# Patient Record
Sex: FEMALE | Race: OTHER | Employment: FULL TIME | URBAN - METROPOLITAN AREA
[De-identification: names, ages, dates, MRNs, and addresses within clinical notes are randomized per-mention and may not be internally consistent; named-entity substitution may affect disease eponyms.]

---

## 2017-02-15 ENCOUNTER — OFFICE VISIT (OUTPATIENT)
Dept: FAMILY MEDICINE CLINIC | Age: 42
End: 2017-02-15

## 2017-02-15 VITALS
HEIGHT: 61 IN | RESPIRATION RATE: 18 BRPM | BODY MASS INDEX: 26.36 KG/M2 | SYSTOLIC BLOOD PRESSURE: 144 MMHG | DIASTOLIC BLOOD PRESSURE: 88 MMHG | TEMPERATURE: 97.2 F | WEIGHT: 139.6 LBS | OXYGEN SATURATION: 98 % | HEART RATE: 95 BPM

## 2017-02-15 DIAGNOSIS — R51.9 HEADACHE DISORDER: ICD-10-CM

## 2017-02-15 DIAGNOSIS — J30.89 ENVIRONMENTAL AND SEASONAL ALLERGIES: ICD-10-CM

## 2017-02-15 DIAGNOSIS — I10 ESSENTIAL HYPERTENSION: Primary | ICD-10-CM

## 2017-02-15 RX ORDER — AMLODIPINE BESYLATE 10 MG/1
10 TABLET ORAL DAILY
Qty: 30 TAB | Refills: 6 | Status: SHIPPED | OUTPATIENT
Start: 2017-02-15 | End: 2017-09-22 | Stop reason: SDUPTHER

## 2017-02-15 NOTE — MR AVS SNAPSHOT
Visit Information [place] Francisco nelson Moralesarmanicaroline Personal Médico Departamento Teléfono del Dep. Número de visita 2/15/2017  1:20 PM Christopher Jenkins MD [address] [place] [address] [phone] Follow-up Instructions Return in about 6 months (around 8/15/2017) for Follow-up, HTN. Upcoming Health Maintenance Date Due DTaP/Tdap/Td series (1 - Tdap) 3/17/1996 PAP AKA CERVICAL CYTOLOGY 3/17/1996 INFLUENZA AGE 9 TO ADULT 8/1/2016 Alergias  Review Complete El: 2/15/2017 Por: Christopher Jenkins MD  
 A partir del:  2/15/2017 Intensidad Anotado Tipo de reacción [employer]  05/22/2014    Rash, Swelling Vacunas actuales Ahmed Kans No hay ninguna vacuna archivada. No revisadas esta visita You Were Diagnosed With   
  
 Suni Davis Essential hypertension    -  Primary ICD-10-CM: I10 
ICD-9-CM: 401.9 Environmental and seasonal allergies     ICD-10-CM: J30.89 ICD-9-CM: 477.8 Headache disorder     ICD-10-CM: R46 ICD-9-CM: 784.0 Partes vitales PS Pulso Temperatura Resp [place] ( percentil de crecimiento) Peso (percentil de crecimiento) 144/88 (BP 1 Location: Left arm, BP Patient Position: Sitting) 95 97.2 °F (36.2 °C) (Oral) 18 5' 1.42\" (1.56 m) 139 lb 9.6 oz (63.3 kg) SpO2 BMI (IMC) Estado obstétrico Estatus de tabaquísmo 98% 26.02 kg/m2 Menopause Never Smoker Historial de signos vitales BMI and BSA Data Body Mass Index Body Surface Area 26.02 kg/m 2 1.66 m 2 [facility] Name Phone [facility], [address] [place] [address] [phone] Richards lista de medicamentos actualizada Lista actualizada el: 2/15/17  1:56 PM.  Kimo Andres use richards lista de medicamentos más reciente. amLODIPine 10 mg tablet También conocido lydia:  Rand Colon Take 1 Tab by mouth daily. cetirizine 10 mg tablet También conocido lydia:  ZYRTEC Take 1 Tab by mouth daily. Recetas Enviado a la [place] Refills  
 amLODIPine (NORVASC) 10 mg tablet 6 Sig: Take 1 Tab by mouth daily. Class: Normal  
 Pharmacy: [facility] Man[facility]et Titus, [address] #: [phone] Route: Oral  
  
Instrucciones de seguimiento Return in about 6 months (around 8/15/2017) for Follow-up, HTN. Instrucciones para el Paciente Dieta DASH: Instrucciones de cuidado - [ DASH Diet: Care Instructions ] Instrucciones de cuidado La dieta DASH es un plan de alimentación que puede ayudar a bajar la presión arterial. DASH es la sigla en inglés de \"Dietary Approaches to Stop Hypertension\" (medidas dietéticas para disminuir la hipertensión). Hipertensión significa presión arterial simon. La dieta DASH se concentra en el consumo de alimentos con alto contenido de calcio, potasio y [place]. Estos nutrientes pueden disminuir la presión arterial. Los alimentos con el mayor contenido de [place] nutrientes son las frutas, las verduras, los productos lácteos de bajo contenido de [place], las nueces, las semillas y las legumbres. Lorena chela suplementos de calcio, potasio y magnesio, en lugar de comer alimentos ricos en estos nutrientes, no tiene el mismo efecto. La dieta DASH también incluye granos integrales, pescado y aves. La dieta DASH es maria dolores de los cambios de estilo de ko que quizá le recomiende baig médico para reducir la presión arterial simon. Es posible que baig médico también quiera que usted reduzca la cantidad de sodio en baig [place] Brendan. Reducir el sodio mientras sigue la dieta DASH puede bajar la presión arterial incluso más que únicamente con la dieta DASH. La atención de seguimiento es shaila parte clave de baig tratamiento y seguridad.  Asegúrese de hacer y acudir a todas las citas, y llame a baig médico si está teniendo problemas. También es shaila buena idea saber los resultados de cuauhtemoc exámenes y mantener shaila lista de los medicamentos que brian. Cómo puede cuidarse en el hogar? Cómo seguir la dieta DASH 
· Coma entre 4 y 5 porciones de fruta al día. Shaila porción incluye 1 fruta de [place], ½ taza de fruta enlatada o cortada en trozos, 1/4 taza de fruta seca o 4 onzas (½ taza) de jugo de frutas. Elija fruta con más frecuencia que jugo. · Consuma entre 4 y 11 porciones de verduras al día. Shaila porción incluye 1 taza de [place]bra o de verduras de hojas crudas, ½ taza de verduras cocidas o cortadas en trozos, o 4 onzas (½ taza) de jugo de verduras. Elija comer verduras con más frecuencia que chela baig jugo. · Consuma entre 2 y 3 porciones de lácteos semidescremados y descremados al día. Shaila porción incluye 8 onzas de [place], 1 taza de yogur o 1½ onzas de [place]-barre. · Coma entre 6 y 6 porciones de granos todos los [address] Shaila porción incluye 1 rebanada de pan, 1 onza de cereal seco, o ½ taza de arroz, pasta o cereal cocido. Trate de elegir productos de grano integral con la mayor frecuencia posible. · Limite la cantidad de [place] aves y pescado a 2 porciones al día. Roseann Martini porción son 3 onzas, lo que es aproximadamente el tamaño de un shy de naipes. · Coma entre 4 y 5 porciones de nueces, semillas y legumbres (frijoles [habichuelas], lentejas y arvejas [chícharos] secos y cocidos) a la semana. Shaila porción incluye 1/3 taza de nueces, 2 cucharadas de semillas o ½ taza de frijoles o arvejas cocidos. · [address] y aceites a 2 o 3 porciones al día. Shaila porción es 1 cucharadita de aceite vegetal o 2 cucharadas de aderezo para ensaladas. · Limite los dulces y el azúcar adicional a 5 porciones o menos por semana. Roseann Martini porción es 1 cucharada de [place] o [place], ½ taza de sorbete o 1 taza de limonada. · Consuma menos de 2,300 miligramos (mg) de sodio al día.  Si limita baig consumo de sodio a 1,500 mg al día, puede reducir baig presión arterial aún más. Consejos para tener éxito · Inicie con cambios pequeños. No intente hacer cambios radicales en baig dieta de manera repentina. Podría sentir que extraña cuauhtemoc comidas favoritas y, por lo [place], jasvir shaila mayor probabilidad de que no cumpla el plan. Michelle Negron y [place]. Clara Holes que esos cambios se conviertan en un hábito, incorpore algunos [employer]. · Pruebe algo de lo siguiente: 
¨ Fíjese el objetivo de comer shaila porción de fruta o de verduras en todas las comidas y los refrigerios. [place] facilitará alcanzar la cantidad diaria recomendada de frutas y verduras. ¨ Pruebe comer yogur cubierto con frutas frescas y nueces lydia refrigerio o lydia postre saludable. ¨ Agregue jorge a, tomate, pepino y cebolla a cuauhtemoc sándwiches. ¨ Combine shaila masa de pizza precocida con queso mozzarella de bajo contenido de grasa (\"low-fat\") y cúbralo con abundantes verduras. Intente utilizar tomates, calabaza, espinaca, brócoli, zanahorias, coliflor y cebollas. ¨ Opte por comer shaila variedad de verduras cortadas en trozos con un aderezo de bajo contenido de grasa lydia refrigerio, en lugar de \"chips\" (tipo janet fritas) y un \"dip\" (producto untable). ¨ Espolvoree semillas de girasol o almendras picadas [employer]. O intente agregar nueces o almendras picadas a las verduras cocidas. ¨ Pruebe algunas comidas vegetarianas con frijoles y arvejas. Arnoldo Osmany o frijoles rojos a las ensaladas. Prepare burritos y tacos con puré de frijoles pintos o negros. Dónde puede encontrar más información en inglés? Erika Klein a [email or web address] Regan COSBY86 en la búsqueda para aprender más acerca de \"Dieta DASH: Instrucciones de cuidado - [ DASH Diet: Care Instructions ]. \" 
Revisado: 23 marzo, 2016 Versión del contenido: 11.1 © 0150-0902 [employer] Inc[employer].  Las instrucciones de cuidado fueron adaptadas bajo licencia por [employer] Connections (which disclaims liability or warranty for this information). Si usted tiene [place] afección médica o sobre estas instrucciones, siempre pregunte a baig profesional de elizabeth. [facility], Incorporated niega toda garantía o responsabilidad por baig uso de esta información. Introducing [facility]! [facility] introduce portal paciente MyChart . Ahora se puede acceder a partes de baig expediente médico, enviar por correo electrónico la oficina de baig médico y solicitar renovaciones de medicamentos en línea. En baig navegador de Internet , Lazarus Newberry a [email or web address] Thelma clic en el usuario por Gabe Ingamy? Javad Greens clic aquí en la sesión Norvel Monica. Verá la página de registro [place]. Ingrese baig código de [employer] syd y lydia aparece a continuación. [place] no tendrá que [employer] código después de jasvir completado el proceso de registro . Si usted no se inscribe antes de la fecha de caducidad , debe solicitar un nuevo código. · MyChart Código de acceso : [id number] Expires: 5/16/2017  1:23 PM 
 
Ingresa los últimos cuatro dígitos de baig Número de Seguro Social ( xxxx ) y fecha de nacimiento ( dd / mm / aaaa ) lydia se indica y thelma clic en Enviar. [place] será llevado a la siguiente página de registro . Crear un ID MyChart . Esta será baig ID de inicio de sesión de MyChart y no puede ser [place] , por lo que pensar en shaila que es [place]  y fácil de recordar . Crear shaila contraseña MyChart . lCyde puede cambiar baig contraseña en cualquier momento . Ingrese baig Password Reset de preguntas y [place] . [place] se puede utilizar en un momento posterior si usted olvida baig contraseña. Introduzca baig dirección de correo electrónico . Janett Lockett recibirá shaila notificación por correo electrónico cuando la nueva información está disponible en MyChart . Greg shook en Registrarse.  Daralene Mecca melvin y descargar porciones de baig expediente Shonna shook en el enlace de descarga del menú Resumen para descargar shaila copia portátil de baig información médica . Si tiene [employer] , por favor visite la sección de preguntas frecuentes del sitio web MyChart . Recuerde, MyChart NO es que se utilizará para las necesidades urgentes. Para emergencias médicas , llame al 911 . Ahora disponible en baig iPhone y Android ! Por favor proporcione lesley resumen de la documentación de cuidado a baig próximo proveedor. Your primary care clinician is listed as Phys Other. If you have any questions after today's visit, please call [phone].

## 2017-02-15 NOTE — PATIENT INSTRUCTIONS
Dieta DASH: Instrucciones de cuidado - [ DASH Diet: Care Instructions ]  Instrucciones de cuidado  La dieta DASH es un plan de alimentación que puede ayudar a bajar la presión arterial. DASH es la sigla en inglés de \"Dietary Approaches to Stop Hypertension\" (medidas dietéticas para disminuir la hipertensión). Hipertensión significa presión arterial simon. La dieta DASH se concentra en el consumo de alimentos con alto contenido de calcio, potasio y [place]. Estos nutrientes pueden disminuir la presión arterial. Los alimentos con el mayor contenido de [place] nutrientes son las frutas, las verduras, los productos lácteos de bajo contenido de [place], las nueces, las semillas y las legumbres. Lorena chela suplementos de calcio, potasio y magnesio, en lugar de comer alimentos ricos en estos nutrientes, no tiene el mismo efecto. La dieta DASH también incluye granos integrales, pescado y aves. La dieta DASH es maria dolores de los cambios de estilo de ko que quizá le recomiende baig médico para reducir la presión arterial simon. Es posible que baig médico también quiera que usted reduzca la cantidad de sodio en baig [place]ista Sit. Reducir el sodio mientras sigue la dieta DASH puede bajar la presión arterial incluso más que únicamente con la dieta DASH. La atención de seguimiento es shaila parte clave de baig tratamiento y seguridad. Asegúrese de hacer y acudir a todas las citas, y llame a baig médico si está teniendo problemas. También es shaila buena idea saber los resultados de cuauhtemoc exámenes y mantener shaila lista de los medicamentos que brian. ¿Cómo puede cuidarse en el hogar? Cómo seguir la dieta DASH  · Coma entre 4 y 5 porciones de fruta al día. Shaila porción incluye 1 fruta de [place], ½ taza de fruta enlatada o cortada en trozos, 1/4 taza de fruta seca o 4 onzas (½ taza) de jugo de frutas. Elija fruta con más frecuencia que jugo. · Consuma entre 4 y 11 porciones de verduras al día.  Shaila porción incluye 1 taza de [place]bra o de verduras de hojas crudas, ½ taza de verduras cocidas o cortadas en trozos, o 4 onzas (½ taza) de jugo de verduras. Elija comer verduras con más frecuencia que chela baig jugo. · Consuma entre 2 y 3 porciones de lácteos semidescremados y descremados al día. Shaila porción incluye 8 onzas de [place], 1 taza de yogur o 1½ onzas de [place]-barre. · Coma entre 6 y 6 porciones de granos todos los [address] Shaila porción incluye 1 rebanada de pan, 1 onza de cereal seco, o ½ taza de arroz, pasta o cereal cocido. Trate de elegir productos de grano integral con la mayor frecuencia posible. · Limite la cantidad de [facility] (the territory [place]), aves y pescado a 2 porciones al día. Kim Billow porción son 3 onzas, lo que es aproximadamente el tamaño de un shy de naipes. · Coma entre 4 y 5 porciones de nueces, semillas y legumbres (frijoles [habichuelas], lentejas y arvejas [chícharos] secos y cocidos) a la semana. Shaila porción incluye 1/3 taza de nueces, 2 cucharadas de semillas o ½ taza de frijoles o arvejas cocidos. · [address] [facility] y aceites a 2 o 3 porciones al día. Shaila porción es 1 cucharadita de aceite vegetal o 2 cucharadas de aderezo para ensaladas. · Limite los dulces y el azúcar adicional a 5 porciones o menos por semana. Kim Billow porción es 1 cucharada de Ronal o [place], ½ taza de sorbete o 1 taza de limonada. · Consuma menos de 2,300 miligramos (mg) de sodio al día. Si limita baig consumo de sodio a 1,500 mg al día, puede reducir baig presión arterial aún más. Consejos para tener éxito  · Inicie con cambios pequeños. No intente hacer cambios radicales en baig dieta de manera repentina. Podría sentir que extraña cuauhtemoc comidas favoritas y, por lo [place], jasvir shaila mayor probabilidad de que no cumpla el plan. Melissa Amaya. Karn Bernheim que esos cambios se conviertan en un hábito, incorpore algunos [employer]. · Pruebe algo de lo siguiente:  ¨ Fíjese el objetivo de comer shaila porción de fruta o de verduras en todas las comidas y los refrigerios.  Cedric Brightly alcanzar la cantidad diaria recomendada de frutas y verduras. ¨ Pruebe comer yogur cubierto con frutas frescas y nueces lydia refrigerio o lydia postre saludable. ¨ Agregue jorge a, tomate, pepino y cebolla a cuauhtemoc sándwiches. ¨ Combine shaila masa de pizza precocida con queso mozzarella de bajo contenido de grasa (\"low-fat\") y cúbralo con abundantes verduras. Intente utilizar tomates, calabaza, espinaca, brócoli, zanahorias, coliflor y cebollas. ¨ Opte por comer shaila variedad de verduras cortadas en trozos con un aderezo de bajo contenido de grasa lydia refrigerio, en lugar de \"chips\" (tipo janet fritas) y un \"dip\" (producto untable). ¨ Espolvoree semillas de girasol o almendras picadas [employer]. O intente agregar nueces o almendras picadas a las verduras cocidas. ¨ Pruebe algunas comidas vegetarianas con frijoles y arvejas. Ashley Callas o frijoles rojos a las ensaladas. Prepare burritos y tacos con puré de frijoles pintos o negros. ¿Dónde puede encontrar más información en inglés? Carrillo Valladares a http://[email or web address]-reina.info/. Armand COOK en la búsqueda para aprender más acerca de \"Dieta DASH: Instrucciones de cuidado - [ DASH Diet: Care Instructions ]. \"  Revisado: 23 marzo, 2016  Versión del contenido: 11.1  © 1431-4690 "[employer] Incorporated. Las instrucciones de cuidado fueron adaptadas bajo licencia por Good Help Connections (which disclaims liability or warranty for this information). Si usted tiene [place] afección médica o sobre estas instrucciones, siempre pregunte a baig profesional de elizabeth. [facility], Incorporated niega toda garantía o responsabilidad por baig uso de esta información.

## 2017-02-15 NOTE — PROGRESS NOTES
HISTORY OF PRESENT ILLNESS  Clarissa Councilman is a 39 y.o. female. Hypertension   The history is provided by the patient. This is a chronic problem. Progression since onset: Bp has been under control . she has been on Amlodipine . No notable side effects. Compliant with medications. Headache    The history is provided by the patient. This is a chronic problem. Progression since onset: She has been havin headache since last 2 weeks . She has tried Advil which has helped . Allergies   The history is provided by the patient. This is a chronic problem. Progression since onset: She has no symptoms at present and she has been taking Cetirizine . Review of Systems   Constitutional: Negative. HENT: Negative. Eyes: Negative. Respiratory: Negative. Cardiovascular: Negative. Gastrointestinal: Negative. Genitourinary: Negative. Musculoskeletal: Negative. Skin: Negative. Neurological: Negative. Endo/Heme/Allergies: Negative. Psychiatric/Behavioral: Negative. Physical Exam  General:   Head: Alert, cooperative, no distress, appears stated age. Normocephalic and atraumatic   Eyes:  Conjunctivae/corneas clear. PERRL, EOMs intact. Ears:  Normal TMs and external ear canals both ears. Nose: Nares normal. Septum midline. Mucosa normal. No drainage or sinus tenderness. Mouth:  Throat: Lips, mucosa, and tongue normal. Teeth and gums normal.  No lesions or exudates. Neck: Supple, symmetrical, trachea midline, no adenopathy, thyroid: no enlargement/tenderness/nodules. Back:   Symmetric, no curvature. ROM normal. No CVA tenderness. Lungs:   Clear to auscultation bilaterally. Heart:  Regular rate and rhythm, S1, S2 normal, no murmur, click, rub or gallop. Abdomen:   Soft, non-tender. Bowel sounds normal. No masses,  No organomegaly. Extremities: Extremities normal, atraumatic, no cyanosis or edema. Pulses: 2+ and symmetric all extremities.    Skin: Skin color, texture, turgor normal. No rashes or lesions   Lymph nodes: Cervical & supraclavicular nodes normal.   Neurologic: CNII-XII intact. Normal strength, sensation and reflexes throughout. Psych:                      Normal mood and affect     ASSESSMENT and PLAN  Encounter Diagnoses   Name Primary?  Essential hypertension Yes    Environmental and seasonal allergies     Headache disorder      Orders Placed This Encounter    amLODIPine (NORVASC) 10 mg tablet   Follow-up Disposition:  Return in about 6 months (around 8/15/2017) for Follow-up, HTN. Reviewed and discussed previous lab results. She was advised to continue with current medications. She was given an after visit summary which includes diagnoses, vital signs, current medications, &  authorized prescriptions. Patient verbalized understanding.

## 2017-09-22 ENCOUNTER — OFFICE VISIT (OUTPATIENT)
Dept: FAMILY MEDICINE CLINIC | Age: 42
End: 2017-09-22

## 2017-09-22 VITALS
BODY MASS INDEX: 26.58 KG/M2 | HEIGHT: 61 IN | SYSTOLIC BLOOD PRESSURE: 142 MMHG | HEART RATE: 96 BPM | RESPIRATION RATE: 12 BRPM | WEIGHT: 140.8 LBS | DIASTOLIC BLOOD PRESSURE: 101 MMHG | OXYGEN SATURATION: 100 % | TEMPERATURE: 98.5 F

## 2017-09-22 DIAGNOSIS — I10 ESSENTIAL HYPERTENSION: Primary | ICD-10-CM

## 2017-09-22 DIAGNOSIS — M79.672 PAIN OF LEFT HEEL: ICD-10-CM

## 2017-09-22 DIAGNOSIS — J30.89 NON-SEASONAL ALLERGIC RHINITIS, UNSPECIFIED ALLERGIC RHINITIS TRIGGER: ICD-10-CM

## 2017-09-22 RX ORDER — AMLODIPINE BESYLATE 10 MG/1
10 TABLET ORAL DAILY
Qty: 90 TAB | Refills: 3 | Status: SHIPPED | OUTPATIENT
Start: 2017-09-22 | End: 2018-08-14 | Stop reason: SDUPTHER

## 2017-09-22 NOTE — PROGRESS NOTES
Kike Melendez [address] [place], [address] [place] [address] Road  [phone]             Date of visit: 2017   Subjective:      History obtained from:  the patient. Adrianne Talavera is a 43 y.o. 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 Walks a lot on her job at [employer] some other exercise as well  Tries to eat well, plenty of veggies  Drinks water, not soda      [facility] pap smear. Was told it was normal, about 1.5 years ago  Has implant in arm for birth control    Uses zyrtec every few days prn allergies, works well for her    Patient Active Problem List    Diagnosis Date Noted    Pain of left heel 2017    Essential hypertension 2017    Non-seasonal allergic rhinitis 2017     Current Outpatient Prescriptions on File Prior to Visit   Medication Sig Dispense Refill    cetirizine (ZYRTEC) 10 mg tablet Take 1 Tab by mouth daily. (Patient taking differently: Take 10 mg by mouth daily as needed.) 30 Tab 3     No current facility-administered medications on file prior to visit.       Allergies   Allergen Reactions    Seafood Rash and Swelling     not all seafood- can eat shrimp and salmon     Past Medical History:   Diagnosis Date    Hypertension      Past Surgical History:   Procedure Laterality Date    HX  SECTION      X3    HX HERNIA REPAIR      umbilical hernia repair      Family History   Problem Relation Age of Onset    Hypertension Mother     Hypertension Sister    [facility] Anesth Problems Neg Hx      Social History   Substance Use Topics    Smoking status: Never Smoker    Smokeless tobacco: Never Used    Alcohol use No      Social History     Social History Narrative          Review of Systems  Card: denies chest pain  Pulm: denies shortness of breath        Objective:     Vitals:    09/22/17 1006 09/22/17 1014   BP: (!) 158/100 (!) 142/101   Pulse: 96    Resp: 12    Temp: 98.5 °F (36.9 °C)    TempSrc: Oral    SpO2: 100%    Weight: 140 lb 12.8 oz (63.9 kg)    Height: 5' 1\" (1.549 m)      Body mass index is 26.6 kg/(m^2). General: stated age, well developed, well nourished and in NAD  Neck: supple, symmetrical, trachea midline, no adenopathy and thyroid: not enlarged, symmetric, no tenderness/mass/nodules  Lungs:  clear to auscultation w/o rales, rhonchi, wheezes w/normal effort and no use of accessory muscles of respiration   Heart: regular rate and rhythm, S1, S2 normal, no murmur, click, rub or gallop  Abdomen: soft, nontender  Ext:  No edema noted. MSK: left heel tender at insertion plantar fasciia, no swelling or deformity  Lymph: no cervical adenopathy appreciated  Skin:  Normal. and no rash or abnormalities   Psych: alert and oriented to person, place, time and situation and Speech: appropriate quality, quantity and organization of sentences    Lab Results   Component Value Date/Time    Sodium 141 09/09/2015 04:43 PM    Potassium 4.1 09/09/2015 04:43 PM    Chloride 102 09/09/2015 04:43 PM    CO2 22 09/09/2015 04:43 PM    Glucose 119 09/09/2015 04:43 PM    BUN 9 09/09/2015 04:43 PM    Creatinine 1.02 09/09/2015 04:43 PM    BUN/Creatinine ratio 9 09/09/2015 04:43 PM    GFR est AA 80 09/09/2015 04:43 PM    GFR est non-AA 69 09/09/2015 04:43 PM    Calcium 9.3 09/09/2015 04:43 PM    Bilirubin, total <0.2 09/09/2015 04:43 PM    AST (SGOT) 13 09/09/2015 04:43 PM    Alk.  phosphatase 76 09/09/2015 04:43 PM    Protein, total 7.5 09/09/2015 04:43 PM    Albumin 4.3 09/09/2015 04:43 PM    A-G Ratio 1.3 09/09/2015 04:43 PM    ALT (SGPT) 14 09/09/2015 04:43 PM     Lab Results   Component Value Date/Time    WBC 6.9 09/09/2015 04:43 PM    HGB 14.0 09/09/2015 04:43 PM    HCT 41.1 09/09/2015 04:43 PM    PLATELET 472 25/97/8216 04:43 PM    MCV 84 09/09/2015 04:43 PM     Lab Results   Component Value Date/Time    TSH 0.955 05/22/2014 11:37 AM       Assessment/Plan:       ICD-10-CM ICD-9-CM    1. Essential hypertension P67 082.7 METABOLIC PANEL, COMPREHENSIVE      LIPID PANEL   2. Pain of left heel M79.672 729.5    3. Non-seasonal allergic rhinitis, unspecified allergic rhinitis trigger J30.89 477.8        Orders Placed This Encounter    METABOLIC PANEL, COMPREHENSIVE    LIPID PANEL    amLODIPine (NORVASC) 10 mg tablet     Was doing well on norvasc, just needs to get back on it  goodrx coupon given  Encouraged her to check it weekly and update me if running high  Continued healthy diet/exercise encouraged    Left heel pain not sure of diagnosis but may be plantar fasciitis  Advised starting with blue gel inserts, ice bid for 15 min when painful  Tylenol prn  Avoiding nsaids given htn    Allergies do well with prn zyrtec    Discussed the diagnosis and plan and she expressed understanding. Follow-up Disposition:  Return in about 1 year (around 9/22/2018).     Bib Pittman MD

## 2017-09-22 NOTE — MR AVS SNAPSHOT
Visit Information Becca Krishna De [place] Personal M[facility]ico Departamento Teléfono del Dep. Número de visita 9/22/2017 10:00 AM Albert [place], [address] [phone] Upcoming Health Maintenance Date Due  
 PAP AKA CERVICAL CYTOLOGY 3/17/1996 INFLUENZA AGE 9 TO ADULT 8/1/2017 DTaP/Tdap/Td series (2 - Td) 1/7/2023 Alergias  Review Complete El: 9/22/2017 Por: MD Davon Hargrove del:  9/22/2017 Intensidad Anotado Tipo de reacción [employer]  05/22/2014    Rash, Swelling  
 not all seafood- can eat shrimp and salmon Vacunas actuales Revisadas el:  9/14/2017 Clemetine Ours Hep B Vaccine 1/19/2010, 7/13/2009, 5/12/2009 Influenza Vaccine 12/17/2014 MMR 5/20/2008, 8/4/2006 Td 1/7/2013, 5/3/2011 Tdap 6/27/2012 No revisadas esta visita You Were Diagnosed With   
  
 Fozia Geri Essential hypertension    -  Primary ICD-10-CM: I10 
ICD-9-CM: 401.9 Partes vitales PS Pulso Temperatura Resp [place] ( percentil de crecimiento) Peso (percentil de crecimiento) (!) 142/101 (BP 1 Location: Left arm, BP Patient Position: Sitting) 96 98.5 °F (36.9 °C) (Oral) 12 5' 1\" (1.549 m) 140 lb 12.8 oz (63.9 kg) SpO2 BMI (IMC) Estado obstétrico Estatus de tabaquísmo 100% 26.6 kg/m2 Menopause Never Smoker Historial de signos vitales BMI and BSA Data Body Mass Index Body Surface Area  
 26.6 kg/m 2 1.66 m 2 [facility] Name Phone Roberto Nino [address], [address] [phone] Richards lista de medicamentos actualizada Lista actualizada el: 9/22/17 10:42 AM.  David Scott use richards lista de medicamentos más reciente. amLODIPine 10 mg tablet También conocido lydia:  Marco Antonio Ballard Take 1 Tab by mouth daily. cetirizine 10 mg tablet También conocido lydia:  ZYRTEC  
 Take 1 Tab by mouth daily. Recetas Enviado a la [place] Refills  
 amLODIPine (NORVASC) 10 mg tablet 3 Sig: Take 1 Tab by mouth daily. Class: Normal  
 Pharmacy: Flori [facility], [address]  #: [phone] Route: Oral  
  
Hicimos lo siguiente LIPID PANEL [20293 CPT(R)] METABOLIC PANEL, COMPREHENSIVE [87486 CPT(R)] Introducing [facility]! [facility] introduce portal paciente MyChart . Ahora se puede acceder a partes de baig expediente médico, enviar por correo electrónico la oficina de baig médico y solicitar renovaciones de medicamentos en línea. En baig navegador de Internet , Catherine Plume a [email or web address] Thelma clic en el usuario por Sofia Crowley? Nonda Slates clic aquí en la sesión Swathi Aver. Verá la página de registro Murphy. Ingrese baig código de [employer] syd y lydia aparece a continuación. Usted no tendrá que [employer] código después de jasvir completado el proceso de registro . Si usted no se inscribe antes de la fecha de caducidad , debe solicitar un nuevo código. · MyChart Código de acceso : [facility] Expires: 12/21/2017 10:42 AM 
 
Ingresa los últimos cuatro dígitos de baig Número de Seguro Social ( xxxx ) y fecha de nacimiento ( dd / mm / aaaa ) lydia se indica y thelma clic en Enviar. Usted será llevado a la siguiente página de registro . Crear un ID MyChart . Esta será baig ID de inicio de sesión de MyChart y no puede ser [place] , por lo que pensar en shaila que es [place] y fácil de recordar . Crear shaila contraseña MyChart . Usted puede cambiar baig contraseña en cualquier momento . Ingrese baig Password Reset de preguntas y [place] . [place] se puede utilizar en un momento posterior si usted olvida baig contraseña. Introduzca baig dirección de correo electrónico . Debbie [place] recibirá shaila notificación por correo electrónico cuando la nueva información está disponible en MyChart . Soni Late clic en Registrarse. [place] [employer] melvin y descargar porciones de baig expediente médico. 
Annelise clic en el enlace de descarga del menú Resumen para descargar shaila copia portátil de baig información médica . Si tiene [employer] , por favor visite la sección de preguntas frecuentes del sitio web MyChart . Recuerde, MyChart NO es que se utilizará para las necesidades urgentes. Para emergencias médicas , llame al 911 . Ahora disponible en baig iPhone y Android ! Por favor proporcione lesley resumen de la documentación de cuidado a baig próximo proveedor. Your primary care clinician is listed as Dmitri Gonzalez. If you have any questions after today's visit, please call [phone].

## 2017-09-22 NOTE — PROGRESS NOTES
1. Have you been to the ER, urgent care clinic since your last visit? Hospitalized since your last visit? No    2. Have you seen or consulted any other health care providers outside of the [address] [place] [address] since your last visit? Include any pap smears or colon screening.  No       Chief Complaint   Patient presents with    New Patient     fasting

## 2017-09-23 PROBLEM — E78.5 HYPERLIPIDEMIA, MILD: Status: ACTIVE | Noted: 2017-09-23

## 2017-09-23 LAB
ALBUMIN SERPL-MCNC: 4.3 G/DL (ref 3.5–5.5)
ALBUMIN/GLOB SERPL: 1.2 {RATIO} (ref 1.2–2.2)
ALP SERPL-CCNC: 87 IU/L (ref 39–117)
ALT SERPL-CCNC: 22 IU/L (ref 0–32)
AST SERPL-CCNC: 19 IU/L (ref 0–40)
BILIRUB SERPL-MCNC: 0.2 MG/DL (ref 0–1.2)
BUN SERPL-MCNC: 13 MG/DL (ref 6–24)
BUN/CREAT SERPL: 18 (ref 9–23)
CALCIUM SERPL-MCNC: 8.9 MG/DL (ref 8.7–10.2)
CHLORIDE SERPL-SCNC: 99 MMOL/L (ref 96–106)
CHOLEST SERPL-MCNC: 210 MG/DL (ref 100–199)
CO2 SERPL-SCNC: 22 MMOL/L (ref 18–29)
CREAT SERPL-MCNC: 0.74 MG/DL (ref 0.57–1)
GLOBULIN SER CALC-MCNC: 3.7 G/DL (ref 1.5–4.5)
GLUCOSE SERPL-MCNC: 97 MG/DL (ref 65–99)
HDLC SERPL-MCNC: 45 MG/DL
INTERPRETATION, 910389: NORMAL
LDLC SERPL CALC-MCNC: 132 MG/DL (ref 0–99)
POTASSIUM SERPL-SCNC: 4.2 MMOL/L (ref 3.5–5.2)
PROT SERPL-MCNC: 8 G/DL (ref 6–8.5)
SODIUM SERPL-SCNC: 135 MMOL/L (ref 134–144)
TRIGL SERPL-MCNC: 165 MG/DL (ref 0–149)
VLDLC SERPL CALC-MCNC: 33 MG/DL (ref 5–40)

## 2017-09-23 NOTE — PROGRESS NOTES
Sent in letter:  Kidney, liver, sugar, and electrolytes are fine. Cholesterol is a little high but not bad enough to need medication. I recommend you eat plenty of plant foods and less fatty animal foods (such as red meat, hard cheese, or butter). Getting plenty of exercise is also important.

## 2018-08-14 ENCOUNTER — OFFICE VISIT (OUTPATIENT)
Dept: FAMILY MEDICINE CLINIC | Age: 43
End: 2018-08-14

## 2018-08-14 VITALS
DIASTOLIC BLOOD PRESSURE: 88 MMHG | RESPIRATION RATE: 18 BRPM | TEMPERATURE: 98.7 F | HEART RATE: 91 BPM | OXYGEN SATURATION: 97 % | HEIGHT: 61 IN | WEIGHT: 134 LBS | SYSTOLIC BLOOD PRESSURE: 138 MMHG | BODY MASS INDEX: 25.3 KG/M2

## 2018-08-14 DIAGNOSIS — I10 ESSENTIAL HYPERTENSION: Primary | ICD-10-CM

## 2018-08-14 DIAGNOSIS — M79.672 PAIN OF LEFT HEEL: ICD-10-CM

## 2018-08-14 DIAGNOSIS — M72.2 PLANTAR FASCIITIS, LEFT: ICD-10-CM

## 2018-08-14 RX ORDER — AMLODIPINE BESYLATE 10 MG/1
10 TABLET ORAL DAILY
Qty: 90 TAB | Refills: 3 | Status: SHIPPED | OUTPATIENT
Start: 2018-08-14

## 2018-08-14 NOTE — MR AVS SNAPSHOT
[address] 
[phone] Patient: Raimundo Johnston MRN: [id number] Visit Information Leslie Hamilton Personal Médico Departamento Teléfono del Dep. Número de visita 8/14/2018  3:30 PM Madeline Cosme, [address] [facility] [address] [phone] Follow-up Instructions Return in about 1 year (around 8/14/2019). Upcoming Health Maintenance Date Due Influenza Age 5 to Adult 10/1/2018* PAP AKA CERVICAL CYTOLOGY 3/22/2019 DTaP/Tdap/Td series (2 - Td) 1/7/2023 *Topic was postponed. The date shown is not the original due date. Alergias  Review Complete El: 8/14/2018 Por: Scooter León LPN A partir del:  8/14/2018 Intensidad Anotado Tipo de reacción [employer]  05/22/2014    Rash, Swelling  
 not all seafood- can eat shrimp and salmon Vacunas actuales Revisadas el:  9/14/2017 Rey Lang Hep B Vaccine 1/19/2010, 7/13/2009, 5/12/2009 Influenza Vaccine 12/17/2014 MMR 5/20/2008, 8/4/2006 Td 1/7/2013, 5/3/2011 Tdap 6/27/2012 No revisadas esta visita You Were Diagnosed With   
  
 Dania Prim Essential hypertension    -  Primary ICD-10-CM: I10 
ICD-9-CM: 401.9 Pain of left heel     ICD-10-CM: B40.203 ICD-9-CM: 729.5 Plantar fasciitis, left     ICD-10-CM: M72.2 ICD-9-CM: 728.71 Partes vitales PS Pulso Temperatura Resp [place] ( percentil de crecimiento) Peso (percentil de crecimiento) 138/88 (BP 1 Location: Right arm, BP Patient Position: Sitting) 91 98.7 °F (37.1 °C) (Oral) 18 5' 1\" (1.549 m) 134 lb (60.8 kg) SpO2 BMI (IMC) Estado obstétrico Estatus de tabaquísmo 97% 25.32 kg/m2 Menopause Never Smoker Historial de signos vitales BMI and BSA Data Body Mass Index Body Surface Area  
 25.32 kg/m 2 1.62 m 2 [facility] Name Phone Laci Lawson [address], [facility] [facility] [address] [phone] Baig lista de medicamentos actualizada Michel Barboza actualizada 8/14/18  4:26 PM.  Herminio Megan use baig lista de medicamentos más reciente. amLODIPine 10 mg tablet También conocido lydia:  Lennis Eagles Take 1 Tab by mouth daily. cetirizine 10 mg tablet También conocido lydia:  ZYRTEC Take 1 Tab by mouth daily. Recetas Enviado a la Gama Refills  
 amLODIPine (NORVASC) 10 mg tablet 3 Sig: Take 1 Tab by mouth daily. Class: Normal  
 Pharmacy: Laci Lawson [facility], [address] [facility] #: [phone] Route: Oral  
  
Instrucciones de seguimiento Return in about 1 year (around 8/14/2019). Instrucciones para el Paciente You have plantar fasciitis Ice it 1-2x per day for 15 min with frozen water bottles Wear very comfy shoes Stretch foot back 2x per day for 30 seconds If not starting to get better in a couple of months we could get you braces to wear at night or consider injection, so just call It might take 6-12 months to completely go away Doing strength training can also help a great deal! Watch how at: 
https://Kindling/ Dieta DASH: Instrucciones de cuidado - [ DASH Diet: Care Instructions ] Instrucciones de cuidado La dieta DASH es un plan de alimentación que puede ayudar a bajar la presión arterial. DASH es la sigla en inglés de \"Dietary Approaches to Stop Hypertension\" (medidas dietéticas para disminuir la hipertensión). Hipertensión significa presión arterial simon. La dieta DASH se concentra en el consumo de alimentos con alto contenido de calcio, potasio y [place].  Estos nutrientes pueden disminuir la presión arterial. Los alimentos con el mayor contenido de [employer] nutrientes son las frutas, las verduras, los productos lácteos de bajo contenido de grasa, las nueces, las semillas y las legumbres. Lorena chela suplementos de calcio, potasio y magnesio, en lugar de comer alimentos ricos en estos nutrientes, no tiene el mismo efecto. La dieta DASH también incluye granos integrales, pescado y aves. La dieta DASH es maria dolores de los cambios de estilo de ko que quizá le recomiende baig médico para reducir la presión arterial simon. Es posible que baig médico también quiera que usted reduzca la cantidad de sodio en baig Lysbeth Ace. Reducir el sodio mientras sigue la dieta DASH puede bajar la presión arterial incluso más que únicamente con la dieta DASH. La atención de seguimiento es shaila parte clave de baig tratamiento y seguridad. Asegúrese de hacer y acudir a todas las citas, y llame a baig médico si está teniendo problemas. También es shaila buena idea saber los resultados de cuauhtemoc exámenes y mantener shaila lista de los medicamentos que brian. Cómo puede cuidarse en el hogar? Cómo seguir la dieta DASH 
· Coma entre 4 y 5 porciones de fruta al día. Shaila porción incluye 1 fruta de [place], ½ taza de fruta enlatada o cortada en trozos, 1/4 taza de fruta seca o 4 onzas (½ taza) de jugo de frutas. Elija fruta con más frecuencia que jugo. · Consuma entre 4 y 11 porciones de verduras al día. Shaila porción incluye 1 taza de Debra o de verduras de hojas crudas, ½ taza de verduras cocidas o cortadas en trozos, o 4 onzas (½ taza) de jugo de verduras. Elija comer verduras con más frecuencia que chela baig jugo. · Consuma entre 2 y 3 porciones de lácteos semidescremados y descremados al día. Shaila porción incluye 8 onzas de [place], 1 taza de yogur o 1½ onzas de [place]-barre. · Coma entre 6 y 6 porciones de granos todos los GRASSE. Shaila porción incluye 1 rebanada de pan, 1 onza de cereal seco, o ½ taza de arroz, pasta o cereal cocido. Trate de elegir productos de grano integral con la mayor frecuencia posible. · Limite la cantidad de [place]), aves y pescado a 2 porciones al día. [place] porción son 3 onzas, lo que es aproximadamente el tamaño de un shy de naipes. · Coma entre 4 y 5 porciones de nueces, semillas y legumbres (frijoles [habichuelas], lentejas y arvejas [chícharos] secos y cocidos) a la semana. Shaila porción incluye 1/3 taza de nueces, 2 cucharadas de semillas o ½ taza de frijoles o arvejas cocidos. · [address] [facility] y aceites a 2 o 3 porciones al día. Shaila porción es 1 cucharadita de aceite vegetal o 2 cucharadas de aderezo para ensaladas. · Limite los dulces y el azúcar adicional a 5 porciones o menos por semana. [place] porción es 1 cucharada de Ronal o [place], ½ taza de sorbete o 1 taza de limonada. · Consuma menos de 2,300 miligramos (mg) de sodio al día. Si limita baig consumo de sodio a 1,500 mg al día, puede reducir baig presión arterial aún más. Consejos para tener éxito · Inicie con cambios pequeños. No intente hacer cambios radicales en baig dieta de manera repentina. Podría sentir que extraña cuauhtemoc comidas favoritas y, por lo [place], jasvir shaila mayor probabilidad de que no cumpla el plan. Radha Hernandez. Buster Wilfredo que esos cambios se conviertan en un hábito, incorpore algunos [employer]. · Pruebe algo de lo siguiente: 
¨ Fíjese el objetivo de comer shaila porción de fruta o de verduras en todas las comidas y los refrigerios. [place] facilitará alcanzar la cantidad diaria recomendada de frutas y verduras. ¨ Pruebe comer yogur cubierto con frutas frescas y nueces lydia refrigerio o lydia postre saludable. ¨ Agregue jorge a, tomate, pepino y cebolla a cuauhtemoc sándwiches. ¨ Combine shaila masa de pizza precocida con queso mozzarella de bajo contenido de grasa (\"low-fat\") y cúbralo con abundantes verduras. Intente utilizar tomates, calabaza, espinaca, brócoli, zanahorias, coliflor y cebollas.  
¨ Opte por comer shaila variedad de verduras cortadas en trozos con un aderezo de bajo contenido de grasa lydia refrigerio, en lugar de \"chips\" (tipo janet fritas) y un \"dip\" (producto untable). ¨ [place] semillas de girasol o almendras picadas [employer]. O intente agregar nueces o almendras picadas a las verduras cocidas. ¨ Pruebe algunas comidas vegetarianas con frijoles y arvejas. [place] o frijoles rojos a las ensaladas. Prepare burritos y tacos con puré de frijoles pintos o negros. Dónde puede encontrar más información en inglés? Joyita Cayey a http://donita-reina.info/. Omer Valentina [id number] en la búsqueda para aprender más acerca de \"Dieta DASH: Instrucciones de cuidado - [ DASH Diet: Care Instructions ]. \" 
Revisado: 6 diciembre, 2017 Versión del contenido: 11.7 © 6149-8096 Healthwise, Incorporated. Las instrucciones de cuidado fueron adaptadas bajo licencia por Good [place] [employer] (which disclaims liability or warranty for this information). Si usted tiene [place] afección médica o sobre estas instrucciones, siempre pregunte a baig profesional de elizabeth. Healthwise, Incorporated niega toda garantía o responsabilidad por baig uso de esta información. Fascitis plantar: Ejercicios - [ Plantar Fasciitis: Exercises ] Instrucciones de cuidado Aquí se presentan algunos ejemplos de ejercicios típicos de rehabilitación para tratar baig afección. Empiece cada ejercicio lentamente. Reduzca la intensidad del ejercicio si Vonne Sterling a tener dolor. Baig médico o fisioterapeuta le dirán cuándo puede comenzar con estos ejercicios y cuáles funcionarán mejor para usted. Cómo hacer los ejercicios Estiramiento con toalla 1. Siéntese con las piernas extendidas y las rodillas rectas. 2. Coloque shaila toalla alrededor de baig pie noah por debajo de los dedos. 3. Sostenga cada extremo de la toalla con cada mano, con las rochelle por encima de las rodillas. 4. Jale hacia atrás con la toalla para que el pie se extienda hacia usted. 5. Mantenga la posición por lo menos de 15 a 30 segundos. 6. Repita 2 a 4 veces por sesión, hasta 5 veces al día. Estiramiento de pantorrilla Con lesley ejercicio se estiran los músculos traseros de la parte inferior de la pierna (la pantorrilla) y el tendón de [place]. Annelise lesley ejercicio 3 o 4 veces al día, 5 días a la semana. 1. Póngase de pie frente a shaila pared, con las rochelle apoyadas en la pared a la altura de los ojos. Ponga la pierna que Nereida Pencil a estirar un paso atrás de la [place]. 2. Manteniendo shivani talón en el suelo, doble la pierna de adelante hasta que sienta el estiramiento en la pierna de atrás. 3. Sostenga el estiramiento de 15 a 30 segundos. Repita de 2 a 4 veces. Estiramiento de la fascia plantar y la pantorrilla El estiramiento de la fascia plantar y los músculos de la pantorrilla puede aumentar la flexibilidad y disminuir el dolor del talón. Puede hacer lesley ejercicio varias veces al día antes y después de la actividad. 1. Párese sobre un pie, lydia se [place]. Asegúrese de sujetarse al barandal (pasamanos). 2. Lentamente pose cuauhtemoc talones sobre el borde del escalón a medida que relaja los músculos de la pantorrilla. Debe sentir un leve estiramiento en la parte inferior de baig pie y en la parte de atrás de baig pierna hasta la rodilla. 3. Mantenga lesley estiramiento de 15 a 30 segundos y Bright apriete los músculos de la pantorrilla un poco para llevar el talón hacia atrás hasta el nivel del escalón. Repita de 2 a 4 veces. Flexiones con toalla Annelise lesley ejercicio más difícil colocando un objeto pesado, lydia shaila satnam de sopa, en el otro extremo de la toalla. 1. Estando sentado, coloque el pie sobre shaila toalla en el suelo y acerque la toalla hacia usted con los dedos del pie. 2. Luego, usando [employer] dedos del pie, aleje la toalla de usted. Recoger canicas 1. Ponga canicas en el suelo junto a shaila taza. 2. Usando los dedos del pie, trate de levantar las canicas del piso y ponerlas en la taza. La atención de seguimiento es shaila parte clave de baig tratamiento y seguridad. Asegúrese de hacer y acudir a todas las citas, y llame a baig médico si está teniendo problemas. También es shaila buena idea saber los resultados de cuauthemoc exámenes y mantener shaila lista de los medicamentos que brian. Dónde puede encontrar más información en inglés? Eric Welch a http://donita-reina.info/. Mariajose Silva en la búsqueda para aprender más acerca de \"Fascitis plantar: Ejercicios - [ Plantar Fasciitis: Exercises ]. \" 
Revisado: 29 noviembre, 2017 Versión del contenido: 11.7 © 7057-2990 Healthwise, Incorporated. Las instrucciones de cuidado fueron adaptadas bajo licencia por Good Help Connections (which disclaims liability or warranty for this information). Si usted tiene [place] afección médica o sobre estas instrucciones, siempre pregunte a baig profesional de elizabeth. Healthwise, Incorporated niega toda garantía o responsabilidad por baig uso de esta información. Dieta baja en sodio (2,000 miligramos): Instrucciones de cuidado - [ Low Sodium Diet (2,000 Milligram): Care Instructions ] Instrucciones de cuidado Demasiado sodio hace que el organismo retenga agua en exceso. [place]ijay puede aumentar la presión arterial y obligar al corazón y a los riñones a trabajar Judy Robert. En casos muy graves, podrían tener que hospitalizarlo. Hasta podría poner en peligro la ko. Si limita el consumo de sodio, se sentirá mejor y reducirá baig riesgo de tener problemas graves. La cristina más común de sodio es la sal. Las personas obtienen la mayor parte de la sal en baig dieta de los alimentos enlatados, preparados y de paquete. La comida rápida y los platillos de restaurante también tienen niveles muy altos de [place]. Es probable que baig médico le limite el sodio a menos de 2,000 miligramos (mg) al día. Danay límite tiene en cuenta todo el sodio presente en los alimentos preparados y de [place] air force, y toda la sal que añada a cuauhtemoc alimentos. La atención de seguimiento es shaila parte clave de baig tratamiento y seguridad. Asegúrese de hacer y acudir a todas las citas, y llame a abig médico si está teniendo problemas. También es shaila buena idea saber los resultados de cuauhtemoc exámenes y mantener shaila lista de los medicamentos que brian. Cómo puede cuidarse en el hogar? Patsy las etiquetas de los alimentos · [address] latas y los alimentos de paquete. [employer] qué cantidad de sodio (\"sodium\") hay en cada porción. Asegúrese de fijarse en el tamaño de la porción. Si usted come [place] porción, consumirá [employer] · Las etiquetas de los alimentos también indican el Porcentaje del Valor Diario (\"Percent Daily Value\") recomendado para el sodio. Escoja productos con un bajo Porcentaje del Valor Diario de [place]. · Tenga en cuenta que el sodio puede venir en otras formas además de la sal, entre las que se incluyen el glutamato monosódico (MSG, por cuauhtemoc siglas en inglés), el citrato de sodio y el bicarbonato de [place]. La comida asiática frecuentemente contiene MSG añadido. Si sale a comer, a veces puede pedir que no le pongan MSG ni sal a cuauhtemoc alimentos. Compre alimentos bajos en sodio · Compre alimentos que indiquen en la etiqueta \"sin sal\" (\"unsalted\") (sin sal añadida), \"sin sodio\" (\"sodium-free\") (menos de 5 mg de sodio por porción) o \"bajo en sodio\" (\"low-sodium\") (menos de 140 mg de sodio por porción). Los alimentos que indiquen en la etiqueta \"reducido en sodio\" (\"reduced-sodium\") y \"ligero contenido de sodio\" (\"light sodium\") aún pueden contener demasiado sodio. Asegúrese de leer la etiqueta para verificar cuánto sodio está consumiendo. · Compre verduras frescas o congeladas que no tengan salsas [id number]. Compre las versiones de bajo sodio de verduras [place], sopas y otros productos enlatados. Prepare comidas bajas en sodio · Reduzca la cantidad de sal que [place] para cocinar.  [place] le ayudará a acostumbrarse al sabor. No añada deann después de jasvir cocinado. Shaila cucharadita de sal contiene alrededor de 2,300 mg de sodio. · Retire el salero de la mckeon. · Sazone cuauhtemoc comidas con ajo, jugo de wilson, cebolla, vinagre, hierbas y especias. No use salsa de soya, salsa de soya \"lite\", salsa para jeannie, sal de cebolla, sal de ajo o de apio, mostaza ni ketchup (salsa de tomate) en cuauhtemoc comidas. · Use aderezos para ensaladas, salsas y ketchup de bajo contenido de [place]. O prepare cuauhtemoc propios aderezos para ensaladas y salsas sin añadir sal. 
· Use menos sal (o nada) cuando la receta lo pida. Por lo general puede añadir la mitad de la cantidad de deann que pide la receta sin que esta pierda el sabor. Otros alimentos lydia el arroz, las pastas y los cereales no necesitan sal adicional. 
· Enjuague las verduras enlatadas y cocínelas en agua fresca. [place] le amy algo de sal, mayito no toda. · Evite el agua que naturalmente tenga un alto contenido de sodio o que haya sido tratada con ablandadores, los cuales [facility]. Llame a baig compañía de agua local para averiguar cuál es el contenido de sodio del agua. Si compra agua embotellada, edmundo la etiqueta y escoja shaila valerie sin sodio. Evite los alimentos altos en sodio · Evite comer: ¨ Jeannie, pescados y aves [place], curados, salados y [place]. ¨ Jamón, tocino, perros calientes (\"hot dogs\") y jeannie frías. ¨ Queso común, abhay y procesado y mantequilla de cacahuate (maní) común. ¨ Galletas saladas y otros refrigerios salados lydia \"pretzels\", \"chips\" (lydia janet fritas) y palomitas de maíz saladas. ¨ Comidas preparadas y congeladas, a menos que tengan shaila etiqueta que diga \"bajo en sodio\" (\"low-sodium\"). ¨ Sopas, caldos y consomés enlatados y secos o deshidratados, a menos que digan \"sin sodio\" (\"sodium-free\") o \"bajo en sodio\" (\"low-sodium\"). ¨ Verduras enlatadas, a menos que digan \"sin sodio\" (\"sodium-free\") o \"bajo en sodio\" (\"low-sodium\"). ¨ Doyle fritas (a la francesa), pizzas, tacos y otras comidas rápidas. ¨ Pepinillos, aceitunas, ketchup y otros condimentos, en especial la salsa de soya, a menos que digan \"sin sodio\" (\"sodium-free\") o \"bajo en sodio\" (\"low-sodium\"). Dónde puede encontrar más información en inglés? Erika Klein a http://donita-reina.info/. Regan Fidelina [id number] en la búsqueda para aprender más acerca de \"Dieta baja en sodio (2,000 miligramos): Instrucciones de cuidado - [ Low Sodium Diet (2,000 Milligram): Care Instructions ]. \" 
Revisado: 12 mayo, 2017 Versión del contenido: 11.7 © 0172-4078 Healthwise, Incorporated. Las instrucciones de cuidado fueron adaptadas bajo licencia por Good LOVEThESIGN Connections (which disclaims liability or warranty for this information). Si usted tiene [place] Lutz afección médica o sobre estas instrucciones, siempre pregunte a baig profesional de elizabeth. Healthwise, Incorporated niega toda garantía o responsabilidad por baig uso de esta información. Introducing [facility]! [facility] introduce portal paciente MyChart . Ahora se puede acceder a partes de baig expediente médico, enviar por correo electrónico la oficina de baig médico y solicitar renovaciones de medicamentos en línea. En baig navegador de Internet , Brian Kothari a [email or web address] Inventory. [email or web address]/mychart Thelma clic en el usuario por Rm Maldonado? Jasmyne Broaden clic aquí en la sesión Felipe Gonzalez. Verá la página de registro Maple. Ingrese baig código de [facility] syd y lydia aparece a continuación. Usphu no tendrá que [employer] código después de jasvir completado el proceso de registro . Si usted no se inscribe antes de la fecha de caducidad , debe solicitar un nuevo código. · MyChart Código de acceso : [id number] Expires: 11/12/2018  4:23 PM 
 
Ingresa los últimos cuatro dígitos de baig Número de Seguro Social ( xxxx ) y fecha de nacimiento ( dd / mm / aaaa ) lydia se indica y thelma clic en Enviar. Usted será llevado a la siguiente página de registro . Crear un ID MyChart . Esta será baig ID de inicio de sesión de MyChart y no puede ser [place] , por lo que pensar en shaila que es Elias Franc y fácil de recordar . Crear shaila contraseña MyChart . Usted puede cambiar baig contraseña en cualquier momento . Ingrese baig Password Reset de preguntas y [place] . [place] se puede utilizar en un momento posterior si usted olvida baig contraseña. Introduzca baig dirección de correo electrónico . Fartun Mclean recibirá shaila notificación por correo electrónico cuando la nueva información está disponible en MyChart . Chase Pillar clic en Registrarse. Di Mings melvin y descargar porciones de baig expediente médico. 
Annelise clic en el enlace de descarga del menú Resumen para descargar shaila copia portátil de baig información médica . Si tiene [employer] , por favor visite la sección de preguntas frecuentes del sitio web MyChart . Recuerde, MyChart NO es que se utilizará para las necesidades urgentes. Para emergencias médicas , llame al 911 . Ahora disponible en baig iPhone y Android ! Por favor proporcione lesley resumen de la documentación de cuidado a baig próximo proveedor. Your primary care clinician is listed as Kathy Rosa. If you have any questions after today's visit, please call [phone].

## 2018-08-14 NOTE — PATIENT INSTRUCTIONS
You have plantar fasciitis  Ice it 1-2x per day for 15 min with frozen water bottles  Wear very comfy shoes  Stretch foot back 2x per day for 30 seconds  If not starting to get better in a couple of months we could get you braces to wear at night or consider injection, so just call  It might take 6-12 months to completely go away  Doing strength training can also help a great deal! Watch how at:  [email or web address].Expanite/         Dieta DASH: Instrucciones de [address] - [ DASH Diet: Care Instructions ]  Instrucciones de cuidado    La dieta DASH es un plan de alimentación que puede ayudar a bajar la presión arterial. DASH es la sigla en inglés de \"Dietary Approaches to Stop Hypertension\" (medidas dietéticas para disminuir la hipertensión). Hipertensión significa presión arterial simon. La dieta DASH se concentra en el consumo de alimentos con alto contenido de calcio, potasio y [place]. Estos nutrientes pueden disminuir la presión arterial. Los alimentos con el mayor contenido de [place] nutrientes son las frutas, las verduras, los productos lácteos de bajo contenido de [place], las nueces, las semillas y las legumbres. Lorena chela suplementos de calcio, potasio y magnesio, en lugar de comer alimentos ricos en estos nutrientes, no tiene el mismo efecto. La dieta DASH también incluye granos integrales, pescado y aves. La dieta DASH es maria dolores de los cambios de estilo de ko que quizá le recomiende baig médico para reducir la presión arterial simon. Es posible que baig médico también quiera que usted reduzca la cantidad de sodio en baig Chris Laud. Reducir el sodio mientras sigue la dieta DASH puede bajar la presión arterial incluso más que únicamente con la dieta DASH. La atención de seguimiento es shaila parte clave de baig tratamiento y seguridad. Asegúrese de hacer y acudir a todas las citas, y llame a baig médico si está teniendo problemas.  También es shaila buena idea saber los [place] de cuauhtemoc exámenes y [employer] lista de los Velma-Richie brian. ¿Cómo puede cuidarse en el hogar? Cómo seguir la dieta DASH  · Coma entre 4 y 5 porciones de fruta al día. Adele porción incluye 1 fruta de [place], ½ taza de fruta enlatada o cortada en trozos, 1/4 taza de fruta seca o 4 onzas (½ taza) de jugo de frutas. Elija fruta con más frecuencia que jugo. · Consuma entre 4 y 11 porciones de verduras al día. Adele porción incluye 1 taza de Debra o de verduras de hojas crudas, ½ taza de verduras cocidas o cortadas en trozos, o 4 onzas (½ taza) de jugo de verduras. Elija comer verduras con más frecuencia que hcela baig jugo. · Consuma entre 2 y 3 porciones de lácteos semidescremados y descremados al día. Adele porción incluye 8 onzas de [place], 1 taza de yogur o 1½ onzas de [place]-barre. · Coma entre 6 y 6 porciones de granos todos los [address] Adele porción incluye 1 rebanada de pan, 1 onza de cereal seco, o ½ taza de arroz, pasta o cereal cocido. Trate de elegir productos de grano integral con la mayor frecuencia posible. · Limite la cantidad de [place]), aves y pescado a 2 porciones al día. Amara Drone porción son 3 onzas, lo que es aproximadamente el tamaño de un shy de naipes. · Coma entre 4 y 5 porciones de nueces, semillas y legumbres (frijoles [habichuelas], lentejas y arvejas [chícharos] secos y cocidos) a la semana. Adele porción incluye 1/3 taza de nueces, 2 cucharadas de semillas o ½ taza de frijoles o arvejas cocidos. · [address] y aceites a 2 o 3 porciones al día. Adele porción es 1 cucharadita de aceite vegetal o 2 cucharadas de aderezo para ensaladas. · Limite los dulces y el azúcar adicional a 5 porciones o menos por semana. Amara Drone porción es 1 cucharada de [place] o [place], ½ taza de sorbete o 1 taza de limonada. · Consuma menos de 2,300 miligramos (mg) de sodio al día. Si limita baig consumo de sodio a 1,500 mg al día, puede reducir baig presión arterial aún más. Consejos para tener éxito  · Inicie con cambios pequeños.  No intente hacer cambios radicales en baig dieta de manera repentina. Podría sentir que extraña cuauhtemoc comidas favoritas y, por lo [place], jasvir shaila mayor probabilidad de que no cumpla el plan. Keeley Levin y Calderon. Yuly Prey que esos cambios se conviertan en un hábito, incorpore algunos [employer]. · Pruebe algo de lo siguiente:  ¨ Fíjese el objetivo de comer shaila porción de fruta o de verduras en todas las comidas y los refrigerios. [place] facilitará alcanzar la cantidad diaria recomendada de frutas y verduras. ¨ Pruebe comer yogur cubierto con frutas frescas y nueces lydia refrigerio o lydia postre saludable. ¨ Agregue jorge a, tomate, pepino y cebolla a cuauhtemoc sándwiches. ¨ Combine shaila masa de pizza precocida con queso mozzarella de bajo contenido de grasa (\"low-fat\") y cúbralo con abundantes verduras. Intente utilizar tomates, calabaza, espinaca, brócoli, zanahorias, coliflor y cebollas. ¨ Opte por comer shaila variedad de verduras cortadas en trozos con un aderezo de bajo contenido de grasa lydia refrigerio, en lugar de \"chips\" (tipo janet fritas) y un \"dip\" (producto untable). ¨ [place] semillas de girasol o almendras picadas [employer]. O intente agregar nueces o almendras picadas a las verduras cocidas. ¨ Pruebe algunas comidas vegetarianas con frijoles y arvejas. Indu Henle o frijoles rojos a las ensaladas. Prepare burritos y tacos con puré de frijoles pintos o negros. ¿Dónde puede encontrar más información en inglés? [place] a http://donita-reina.info/. Tatyana  Q214 en la búsqueda para aprender más acerca de \"Dieta DASH: Instrucciones de cuidado - [ DASH Diet: Care Instructions ]. \"  Revisado: 6 shantere, 2017  Versión del contenido: 11.7  © 4302-6436 [employer], [facility]. Las instrucciones de cuidado fueron adaptadas bajo licencia por [employer] (which disclaims liability or warranty for this information).  Si usted tiene preguntas sobre shaila afección Kieran o sobre estas instrucciones, siempre pregunte a baig profesional de elizabeth. Healthwise, Incorporated niega toda garantía o responsabilidad por baig uso de esta información. Fascitis plantar: Ejercicios - [ Plantar Fasciitis: Exercises ]  Instrucciones de cuidado  Aquí se presentan algunos ejemplos de ejercicios típicos de rehabilitación para tratar baig afección. Empiece cada ejercicio lentamente. Reduzca la intensidad del ejercicio si [place] a tener dolor. Baig médico o fisioterapeuta le dirán cuándo puede comenzar con estos ejercicios y cuáles funcionarán mejor para usted. Cómo hacer los ejercicios  Estiramiento con toalla    1. Siéntese con las piernas extendidas y las rodillas rectas. 2. Coloque shaila toalla alrededor de baig pie noah por debajo de los dedos. 3. Sostenga cada extremo de la toalla con cada mano, con las rochelle por encima de las rodillas. 4. Jale hacia atrás con la toalla para que el pie se extienda hacia usted. 5. Mantenga la posición por lo menos de 15 a 30 segundos. 6. Repita 2 a 4 veces por sesión, hasta 5 veces al día. Estiramiento de pantorrilla    Con lesley ejercicio se estiran los músculos traseros de la parte inferior de la pierna (la pantorrilla) y el tendón de [place]. Annelise lesley ejercicio 3 o 4 veces al día, 5 días a la semana. 1. Póngase de pie frente a shaila pared, con las rochelle apoyadas en la pared a la altura de los ojos. Ponga la pierna que Tennis Nickels a estirar un paso atrás de la [place]. 2. Manteniendo shivani talón en el suelo, doble la pierna de adelante hasta que sienta el estiramiento en la pierna de atrás. 3. Sostenga el estiramiento de 15 a 30 segundos. Repita de 2 a 4 veces. Estiramiento de la fascia plantar y la pantorrilla    El estiramiento de la fascia plantar y los músculos de la pantorrilla puede aumentar la flexibilidad y disminuir el dolor del talón. Puede hacer lesley ejercicio varias veces al día antes y después de la actividad. 1. Párese sobre un pie, lydia se [place]. Asegúrese de sujetarse al barandal (pasamanos). 2. Lentamente pose cuauhtemoc talones sobre el borde del escalón a medida que relaja los músculos de la pantorrilla. Debe sentir un leve estiramiento en la parte inferior de baig pie y en la parte de atrás de baig pierna hasta la rodilla. 3. Mantenga lesley estiramiento de 15 a 30 segundos y Bright apriete los músculos de la pantorrilla un poco para llevar el talón hacia atrás hasta el nivel del escalón. Repita de 2 a 4 veces. Flexiones con [employer] lesley ejercicio más difícil colocando un objeto pesado, lydia shaila satnam de sopa, en el otro extremo de la toalla. 1. Estando sentado, coloque el pie sobre shaila toalla en el suelo y acerque la toalla hacia usted con los dedos del pie. 2. Luego, usando [employer] dedos del pie, aleje la toalla de usted. Recoger canicas    1. Ponga canicas en el suelo junto a shaila taza. 2. Usando los dedos del pie, trate de levantar las canicas del piso y ponerlas en la taza. La atención de seguimiento es shaila parte clave de baig tratamiento y seguridad. Asegúrese de hacer y acudir a todas las citas, y llame a baig médico si está teniendo problemas. También es shaila buena idea saber los resultados de cuauhtemco exámenes y mantener shaila lista de los medicamentos que brian. ¿Dónde puede encontrar más información en inglés? Zohreh Saezo a http://donita-reina.info/. Jaelyn Shiver en la búsqueda para aprender más acerca de \"Fascitis plantar: Ejercicios - [ Plantar Fasciitis: Exercises ]. \"  Revisado: 29 noviembre, 2017  Versión del contenido: 11.7  © 5447-7250 Healthwise, Incorporated. Las instrucciones de cuidado fueron adaptadas bajo licencia por [employer] (which disclaims liability or warranty for this information). Si usted tiene [place] afección médica o sobre estas instrucciones, siempre pregunte a baig profesional de elizabeth. Healthwise, Incorporated niega toda garantía o responsabilidad por baig uso de esta información. Dieta baja en sodio (2,000 miligramos): Instrucciones de cuidado - [ Low Sodium Diet (2,000 Milligram): Care Instructions ]  Instrucciones de cuidado    Demasiado sodio hace que el organismo retenga agua en exceso. [place] puede aumentar la presión arterial y obligar al corazón y a los riñones a trabajar Braydon Reyes. En casos muy graves, podrían tener que hospitalizarlo. Hasta podría poner en peligro la ko. Si limita el consumo de sodio, se sentirá mejor y reducirá baig riesgo de tener problemas graves. La cristina más común de sodio es la sal. Las personas obtienen la mayor parte de la sal en baig dieta de los alimentos enlatados, preparados y de paquete. La comida rápida y los platillos de restaurante también tienen niveles muy altos de [place]. Es probable que baig médico le limite el sodio a menos de 2,000 miligramos (mg) al día. Danay límite tiene en cuenta todo el sodio presente en los alimentos preparados y de [employer], y toda la sal que añada a cuauhtemoc alimentos. La atención de seguimiento es shaila parte clave de baig tratamiento y seguridad. Asegúrese de hacer y acudir a todas las citas, y llame a baig médico si está teniendo problemas. También es shaila buena idea saber los resultados de cuauhtemoc exámenes y mantener shaila lista de los medicamentos que brian. ¿Cómo puede cuidarse en el hogar? Patsy las etiquetas de los alimentos  · Patsy las etiquetas de las latas y los alimentos de paquete. [employer] qué cantidad de sodio (\"sodium\") hay en cada porción. Asegúrese de fijarse en el tamaño de la porción. Si usted come Darrel[employer] porción, consumirá VHX. · Las etiquetas de los alimentos también indican el Porcentaje del Valor Diario (\"Percent Daily Value\") recomendado para el sodio. Escoja productos con un bajo Porcentaje del Valor Diario de [place].   · Tenga en cuenta que el sodio puede venir en otras formas además de la sal, entre las que se incluyen el glutamato monosódico (MSG, por cuauhtemoc siglas en inglés), el citrato de sodio y [facility] bicarbonato de sodio. La comida asiática frecuentemente contiene MSG añadido. Si sale a comer, a veces puede pedir que no le pongan MSG ni sal a cuauhtemoc alimentos. Compre alimentos bajos en sodio  · Compre alimentos que indiquen en la etiqueta \"sin sal\" (\"unsalted\") (sin sal añadida), \"sin sodio\" (\"sodium-free\") (menos de 5 mg de sodio por porción) o \"bajo en sodio\" (\"low-sodium\") (menos de 140 mg de sodio por porción). Los alimentos que indiquen en la etiqueta \"reducido en sodio\" (\"reduced-sodium\") y \"ligero contenido de sodio\" (\"light sodium\") aún pueden contener demasiado sodio. Asegúrese de leer la etiqueta para verificar cuánto sodio está consumiendo. · Compre verduras frescas o congeladas que no tengan salsas [id number]. Compre las versiones de bajo sodio de verduras [place], sopas y otros productos enlatados. Prepare comidas bajas en sodio  · Reduzca la cantidad de sal que [place] para cocinar. [place] le ayudará a acostumbrarse al [employer]. No añada deann después de jasvir cocinado. Adele cucharadita de sal contiene alrededor de 2,300 mg de sodio. · Retire el salero de la mckeon. · Sazone cuauhtemoc comidas con ajo, jugo de wilson, cebolla, vinagre, hierbas y especias. No use salsa de soya, salsa de soya \"lite\", salsa para mahogany, sal de cebolla, sal de ajo o de apio, mostaza ni ketchup (salsa de tomate) en cuauhtemoc comidas. · Use aderezos para ensaladas, salsas y ketchup de bajo contenido de [place]. O prepare cuauhtemoc propios aderezos para ensaladas y salsas sin añadir sal.  · Use menos sal (o nada) cuando la receta lo pida. Por lo general puede añadir la mitad de la cantidad de deann que pide la receta sin que esta pierda el sabor. Otros alimentos lydia el arroz, las pastas y los cereales no necesitan sal adicional.  · Enjuague las verduras enlatadas y cocínelas en agua fresca. [place] le amy algo de sal, mayito no toda.   · Evite el agua que naturalmente tenga un alto contenido de sodio o que haya sido tratada con ablandadores, los cuales [place] sodio. Llame a baig compañía de agua local para averiguar cuál es el contenido de sodio del agua. Si compra agua embotellada, edmundo la etiqueta y escoja shaila valerie sin sodio. Evite los alimentos altos en sodio  · Evite comer:  ¨ Jenanie, pescados y aves [place], curados, salados y [place]. ¨ Jamón, tocino, perros calientes (\"hot dogs\") y jeannie frías. ¨ Queso común, abhay y procesado y mantequilla de cacahuate (maní) común. ¨ Galletas saladas y otros refrigerios salados lydia \"pretzels\", \"chips\" (lydia doyle fritas) y palomitas de maíz saladas. ¨ Comidas preparadas y congeladas, a menos que tengan shaila etiqueta que diga \"bajo en sodio\" (\"low-sodium\"). ¨ Sopas, caldos y consomés enlatados y secos o deshidratados, a menos que digan \"sin sodio\" (\"sodium-free\") o \"bajo en sodio\" (\"low-sodium\"). ¨ Verduras enlatadas, a menos que digan \"sin sodio\" (\"sodium-free\") o \"bajo en sodio\" (\"low-sodium\"). ¨ Doyle fritas (a la francesa), pizzas, tacos y otras comidas rápidas. ¨ Pepinillos, aceitunas, ketchup y otros condimentos, en especial la salsa de soya, a menos que digan \"sin sodio\" (\"sodium-free\") o \"bajo en sodio\" (\"low-sodium\"). ¿Dónde puede encontrar más información en inglés? [place] a http://donita-reina.info/. Omer Valentina J898 en la búsqueda para aprender más acerca de \"Dieta baja en sodio (2,000 miligramos): Instrucciones de cuidado - [ Low Sodium Diet (2,000 Milligram): Care Instructions ]. \"  Revisado: 12 mayo, 2017  Versión del contenido: 11.7  © 6411-9637 Healthwise, Incorporated. Las instrucciones de cuidado fueron adaptadas bajo licencia por [facility] Connections (which disclaims liability or warranty for this information). Si usted tiene [place] afección médica o sobre estas instrucciones, siempre pregunte a baig profesional de elizabeth. Healthwise, Incorporated niega toda garantía o responsabilidad por baig uso de esta información.

## 2018-08-14 NOTE — PROGRESS NOTES
Chief Complaint   Patient presents with    Hypertension       Reviewed Record in preparation for visit and have obtained necessary documentation. Identified pt with two pt identifiers (Name @ )    Health Maintenance Due   Topic    Influenza Age 5 to Adult          1. Have you been to the ER, urgent care clinic since your last visit? Hospitalized since your last visit? No    2. Have you seen or consulted any other health care providers outside of the [address] [place] [address] since your last visit? Include any pap smears or colon screening.  No

## 2018-08-15 NOTE — PROGRESS NOTES
Kike Melendez [facility] Kerri. [address], [address]  [phone]             Date of visit: 18   Subjective:      History obtained from:  the patient. Debby Monterroso is a 37 y.o. female who presents today for follow up bp  Doing well although doesn't check often  Takes her med  Eats well, has been able to lose some weight  Stays active also    The left foot heel still hurts  Tries to wear comfortable shoes    Allergies only partially controlled on the zyrtec      Patient Active Problem List    Diagnosis Date Noted    Hyperlipidemia, mild 2017    Pain of left heel 2017    Essential hypertension 2017    Non-seasonal allergic rhinitis 2017     Current Outpatient Prescriptions   Medication Sig Dispense Refill    amLODIPine (NORVASC) 10 mg tablet Take 1 Tab by mouth daily. 90 Tab 3    cetirizine (ZYRTEC) 10 mg tablet Take 1 Tab by mouth daily. (Patient taking differently: Take 10 mg by mouth daily as needed.) 30 Tab 3     Allergies   Allergen Reactions    Seafood Rash and Swelling     not all seafood- can eat shrimp and salmon     Past Medical History:   Diagnosis Date    Hypertension      Past Surgical History:   Procedure Laterality Date    HX  SECTION      X3    HX HERNIA REPAIR  94    umbilical hernia repair      Family History   Problem Relation Age of Onset    Hypertension Mother     Hypertension Sister    Juan Grace Anesth Problems Neg Hx      Social History   Substance Use Topics    Smoking status: Never Smoker    Smokeless tobacco: Never Used    Alcohol use No      Social History     Social History Narrative        Review of Systems  Card: denies chest pain  Pulm: denies shortness of breath     Objective:     Vitals:    18 1534   BP: 138/88   Pulse: 91   Resp: 18   Temp: 98.7 °F (37.1 °C)   TempSrc: Oral   SpO2: 97%   Weight: 134 lb (60.8 kg)   Height: 5' 1\" (1.549 m)     Body mass index is 25.32 kg/(m^2). General: stated age, well developed, well nourished and in NAD  Neck: supple, symmetrical, trachea midline, no adenopathy and thyroid: not enlarged, symmetric, no tenderness/mass/nodules  Lungs:  clear to auscultation w/o rales, rhonchi, wheezes w/normal effort and no use of accessory muscles of respiration   Heart: regular rate and rhythm, S1, S2 normal, no murmur, click, rub or gallop  Abdomen: soft, nontender, no masses  Ext:  No edema noted. Left heel tender at insertion plantar fasciia on heel. No deformities or swelling  Lymph: no cervical adenopathy appreciated  Skin:  Normal. and no rash or abnormalities   Psych: alert and oriented to person, place, time and situation and Speech: appropriate quality, quantity and organization of sentences     Assessment/Plan:       ICD-10-CM ICD-9-CM    1. Essential hypertension I10 401.9    2. Pain of left heel M79.672 729.5    3. Plantar fasciitis, left M72.2 728.71         Orders Placed This Encounter    amLODIPine (NORVASC) 10 mg tablet     No changes to plan for chronic problems other than advised she could try flonase for allergies  Skipping labs this year a they were good last year, not absolutely needed every year and no insurance  Encouraged continued healthy lifestyle    Discussed the diagnosis and plan and she expressed understanding. Follow-up Disposition:  Return in about 1 year (around 8/14/2019).     Mehran Perez MD

## 2019-02-14 ENCOUNTER — HOSPITAL ENCOUNTER (OUTPATIENT)
Dept: LAB | Age: 44
Discharge: HOME | End: 2019-02-14

## 2019-02-14 PROCEDURE — 80061 LIPID PANEL: CPT

## 2019-02-14 PROCEDURE — 82306 VITAMIN D 25 HYDROXY: CPT

## 2019-02-14 PROCEDURE — 80053 COMPREHEN METABOLIC PANEL: CPT

## 2019-02-14 PROCEDURE — 85025 COMPLETE CBC W/AUTO DIFF WBC: CPT

## 2019-02-14 PROCEDURE — 84443 ASSAY THYROID STIM HORMONE: CPT

## 2019-02-14 PROCEDURE — 83036 HEMOGLOBIN GLYCOSYLATED A1C: CPT

## 2019-02-14 PROCEDURE — 82248 BILIRUBIN DIRECT: CPT

## 2019-02-15 LAB
25(OH)D3 SERPL-MCNC: 18 NG/ML (ref 30–100)
ALBUMIN SERPL-MCNC: 4.2 G/DL (ref 3.5–5)
ALBUMIN/GLOB SERPL: 1 {RATIO} (ref 1.1–2.2)
ALP SERPL-CCNC: 92 U/L (ref 45–117)
ALT SERPL-CCNC: 26 U/L (ref 12–78)
ANION GAP SERPL CALC-SCNC: 8 MMOL/L (ref 5–15)
AST SERPL-CCNC: 23 U/L (ref 15–37)
BASOPHILS # BLD: 0.1 K/UL (ref 0–0.1)
BASOPHILS NFR BLD: 1 % (ref 0–1)
BILIRUB DIRECT SERPL-MCNC: 0.1 MG/DL (ref 0–0.2)
BILIRUB SERPL-MCNC: 0.4 MG/DL (ref 0.2–1)
BUN SERPL-MCNC: 10 MG/DL (ref 6–20)
BUN/CREAT SERPL: 15 (ref 12–20)
CALCIUM SERPL-MCNC: 9.1 MG/DL (ref 8.5–10.1)
CHLORIDE SERPL-SCNC: 101 MMOL/L (ref 97–108)
CHOLEST SERPL-MCNC: 235 MG/DL
CO2 SERPL-SCNC: 25 MMOL/L (ref 21–32)
CREAT SERPL-MCNC: 0.68 MG/DL (ref 0.55–1.02)
DIFFERENTIAL METHOD BLD: ABNORMAL
EOSINOPHIL # BLD: 0.3 K/UL (ref 0–0.4)
EOSINOPHIL NFR BLD: 4 % (ref 0–7)
ERYTHROCYTE [DISTWIDTH] IN BLOOD BY AUTOMATED COUNT: 13.3 % (ref 11.5–14.5)
EST. AVERAGE GLUCOSE BLD GHB EST-MCNC: 117 MG/DL
GLOBULIN SER CALC-MCNC: 4.3 G/DL (ref 2–4)
GLUCOSE SERPL-MCNC: 91 MG/DL (ref 65–100)
HBA1C MFR BLD: 5.7 % (ref 4.2–6.3)
HCT VFR BLD AUTO: 47.5 % (ref 35–47)
HDLC SERPL-MCNC: 58 MG/DL
HDLC SERPL: 4.1 {RATIO} (ref 0–5)
HGB BLD-MCNC: 15 G/DL (ref 11.5–16)
IMM GRANULOCYTES # BLD AUTO: 0 K/UL (ref 0–0.04)
IMM GRANULOCYTES NFR BLD AUTO: 0 % (ref 0–0.5)
LDLC SERPL CALC-MCNC: 148.2 MG/DL (ref 0–100)
LIPID PROFILE,FLP: ABNORMAL
LYMPHOCYTES # BLD: 2.4 K/UL (ref 0.8–3.5)
LYMPHOCYTES NFR BLD: 32 % (ref 12–49)
MCH RBC QN AUTO: 29 PG (ref 26–34)
MCHC RBC AUTO-ENTMCNC: 31.6 G/DL (ref 30–36.5)
MCV RBC AUTO: 91.9 FL (ref 80–99)
MONOCYTES # BLD: 0.4 K/UL (ref 0–1)
MONOCYTES NFR BLD: 6 % (ref 5–13)
NEUTS SEG # BLD: 4.3 K/UL (ref 1.8–8)
NEUTS SEG NFR BLD: 57 % (ref 32–75)
NRBC # BLD: 0 K/UL (ref 0–0.01)
NRBC BLD-RTO: 0 PER 100 WBC
PLATELET # BLD AUTO: 283 K/UL (ref 150–400)
PMV BLD AUTO: 10.8 FL (ref 8.9–12.9)
POTASSIUM SERPL-SCNC: 4.3 MMOL/L (ref 3.5–5.1)
PROT SERPL-MCNC: 8.5 G/DL (ref 6.4–8.2)
RBC # BLD AUTO: 5.17 M/UL (ref 3.8–5.2)
SODIUM SERPL-SCNC: 134 MMOL/L (ref 136–145)
TRIGL SERPL-MCNC: 144 MG/DL (ref ?–150)
TSH SERPL DL<=0.05 MIU/L-ACNC: 1.5 UIU/ML (ref 0.36–3.74)
VLDLC SERPL CALC-MCNC: 28.8 MG/DL
WBC # BLD AUTO: 7.5 K/UL (ref 3.6–11)

## 2019-03-11 ENCOUNTER — HOSPITAL ENCOUNTER (OUTPATIENT)
Dept: LAB | Age: 44
Discharge: HOME | End: 2019-03-11

## 2019-03-11 PROCEDURE — 88175 CYTOPATH C/V AUTO FLUID REDO: CPT

## 2019-03-11 PROCEDURE — 87624 HPV HI-RISK TYP POOLED RSLT: CPT

## 2019-06-08 ENCOUNTER — HOSPITAL ENCOUNTER (OUTPATIENT)
Dept: MAMMOGRAPHY | Age: 44
Discharge: HOME | End: 2019-06-08
Attending: NURSE PRACTITIONER

## 2019-06-08 ENCOUNTER — OFFICE VISIT (OUTPATIENT)
Dept: FAMILY PLANNING/WOMEN'S HEALTH CLINIC | Age: 44
End: 2019-06-08

## 2019-06-08 VITALS — DIASTOLIC BLOOD PRESSURE: 84 MMHG | SYSTOLIC BLOOD PRESSURE: 126 MMHG

## 2019-06-08 DIAGNOSIS — Z12.31 SCREENING MAMMOGRAM, ENCOUNTER FOR: Primary | ICD-10-CM

## 2019-06-08 DIAGNOSIS — Z12.31 SCREENING MAMMOGRAM, ENCOUNTER FOR: ICD-10-CM

## 2019-06-08 PROCEDURE — 77067 SCR MAMMO BI INCL CAD: CPT

## 2019-06-08 NOTE — PROGRESS NOTES
EVERY WOMANS LIFE HISTORY QUESTIONNAIRE       No Yes Comments   Has a doctor ever seen or felt anything wrong with your breast? [x]                                  []                                     Have you ever had a breast biopsy? [x]                                  []                                          When and where was last mammogram performed? [facility]    Have you ever been told that there was a problem on your mammogram?   No Yes Comments   [x]                                  []                                       Do you have breast implants? No Yes Comments   [x]                                  []                                       When was your last Pap test performed? [facility] 3/2019    Have you ever had an abnormal Pap test?   No Yes Comments   [x]                                  []                                       Have you had a hysterectomy? No Yes Comments (why)   [x]                                  []                                       Have you been through menopause? No Yes Date of LMP   [x]                                  []                                  3/2019, Nexplanon     Did your mother take TRICIA? No Yes Unknown   [x]                                  []                                       Do you have a history of HIV exposure? No Yes    [x]                                  []                                       Have you ever been diagnosed with any type of Cancer   No Yes Comments (type,when,where,type of treatment   [x]                                  []                                          Has a family member been diagnosed with breast or ovarian cancer?    No Yes Comments (which family members, and type   [x]                                  []                                       Are you taking hormone replacement therapy (HRT)     No Yes Comments   [x]                                  []                                       How many times have you been pregnant? 3        Number of live births ? 3    Are you experiencing any of the following? No Yes Comments   Nipple Discharge [x]                                  []                                     Breast Lump/Masses [x]                                  []                                     Breast Skin Changes [x]                                  []                                          No Yes Comments   Vaginal Discharge [x]                                  []                                     Abnormal/unusual vaginal bleeding [x]                                  []                                         Are you experiencing any other health problems? HBP        Age at first period?  15  Age at first birth? 23    Ht--5'2\"    Wt--Unsure

## 2019-06-08 NOTE — PROGRESS NOTES
HISTORY OF PRESENT ILLNESS  Ivonne Welch is a 40 y.o. female here for [facility]. HPI Ms. Carroll Quiñonez denies abnormal SBE's, performs monthly. Her last mammogram was in 2016 at [facility], negative. She is followed at [address] [place] [address] for her Pap/pelvic and last seen in 3/2019. She also had an Implanon placed at that time. LMP 4/2019, skipping the last 2 months. Non-smoker. Review of Systems   Constitutional: Negative for chills and fever. Respiratory: Negative for shortness of breath. Cardiovascular: Negative for chest pain. Gastrointestinal: Negative for abdominal pain. Physical Exam   Constitutional: She is oriented to person, place, and time. She appears well-developed and well-nourished. Pulmonary/Chest: Right breast exhibits no inverted nipple, no mass, no nipple discharge, no skin change and no tenderness. Left breast exhibits no inverted nipple, no mass, no nipple discharge, no skin change and no tenderness. Breasts are symmetrical.   Lymphadenopathy:     She has no cervical adenopathy. She has no axillary adenopathy. Right: No supraclavicular adenopathy present. Left: No supraclavicular adenopathy present. Neurological: She is alert and oriented to person, place, and time. Skin: Skin is warm and dry. Psychiatric: She has a normal mood and affect. Her behavior is normal. Thought content normal.   Nursing note and vitals reviewed. ASSESSMENT and PLAN  1. [facility]WL  2. CBE benign  3. Screening mammogram today      -last mammogram 2016  4.  Implanon placed 3/2019 ([facility])

## 2020-09-12 ENCOUNTER — OFFICE VISIT (OUTPATIENT)
Dept: FAMILY PLANNING/WOMEN'S HEALTH CLINIC | Age: 45
End: 2020-09-12

## 2020-09-12 ENCOUNTER — HOSPITAL ENCOUNTER (OUTPATIENT)
Dept: MAMMOGRAPHY | Age: 45
Discharge: HOME | End: 2020-09-12
Attending: NURSE PRACTITIONER

## 2020-09-12 DIAGNOSIS — Z12.31 SCREENING MAMMOGRAM, ENCOUNTER FOR: ICD-10-CM

## 2020-09-12 DIAGNOSIS — Z12.31 SCREENING MAMMOGRAM, ENCOUNTER FOR: Primary | ICD-10-CM

## 2020-09-12 PROCEDURE — 77067 SCR MAMMO BI INCL CAD: CPT

## 2020-09-12 NOTE — PROGRESS NOTES
EVERY [facility]OM[facility] LIFE HISTORY QUESTIONNAIRE       No Yes Comments   Has a doctor ever seen or felt anything wrong with your breast? [x]                                  []                                     Have you ever had a breast biopsy? [x]                                  []                                          When and where was last mammogram performed? 2019 [facility]    Have you ever been told that there was a problem on your mammogram?   No Yes Comments   [x]                                  []                                       Do you have breast implants? No Yes Comments   [x]                                  []                                       When was your last Pap test performed? 2019 [facility]    Have you ever had an abnormal Pap test?   No Yes Comments   [x]                                  []                                       Have you had a hysterectomy? No Yes Comments (why)   [x]                                  []                                       Have you been through menopause? No Yes Date of LMP   [x]                                  []                                       Did your mother take TRICIA? No Yes Unknown   [x]                                  []                                       Do you have a history of HIV exposure? No Yes    [x]                                  []                                       Have you ever been diagnosed with any type of Cancer   No Yes Comments (type,when,where,type of treatment   [x]                                  []                                          Has a family member been diagnosed with breast or ovarian cancer?    No Yes Comments (which family members, and type   [x]                                  []                                       Are you taking hormone replacement therapy (HRT)     No Yes Comments   [x]                                  []                                       How many times have you been pregnant? 3       Number of live births ? 3    Are you experiencing any of the following? No Yes Comments   Nipple Discharge []                                  []                                     Breast Lump/Masses []                                  []                                     Breast Skin Changes []                                  []                                          No Yes Comments   Vaginal Discharge []                                  []                                     Abnormal/unusual vaginal bleeding []                                  []                                         Are you experiencing any other health problems? Age at first period?  15  Age at first birth? 23    Ht--5'2    Wt--146lbs

## 2020-09-12 NOTE — PROGRESS NOTES
HISTORY OF PRESENT ILLNESS  Alie Roger is a 39 y.o. female here for [facility]. HPI Ms. Suzette Moody, [place], denies abnormal SBE's, performs monthly. Her last mammogram was in 2019. She denies abnormal vaginal discharge, bloating and pelvic pain. She is UTD with her Pap/pelvic- completed late 2019. LMP 9/01/2020 with normal monthly cycles. She declines a pelvic exam today. Non-smoker    Review of Systems   Constitutional: Negative. Gastrointestinal: Negative. Genitourinary: Negative. Physical Exam  Nursing note reviewed. Constitutional:       Appearance: Normal appearance. Chest:      Breasts:         Right: Normal. No swelling, bleeding, inverted nipple, mass, nipple discharge, skin change or tenderness. Left: Normal. No swelling, bleeding, inverted nipple, mass, nipple discharge, skin change or tenderness. Lymphadenopathy:      Upper Body:      Right upper body: No supraclavicular, axillary or pectoral adenopathy. Left upper body: No supraclavicular, axillary or pectoral adenopathy. Skin:     General: Skin is warm and dry. Neurological:      Mental Status: She is alert and oriented to person, place, and time. Psychiatric:         Mood and Affect: Mood normal.         Behavior: Behavior normal.         ASSESSMENT and PLAN  1. EWL  2. CBE benign  3. Screening mammogram today  4.  CAV info provided

## 2020-12-10 ENCOUNTER — HOSPITAL ENCOUNTER (OUTPATIENT)
Dept: LAB | Age: 45
Discharge: HOME | End: 2020-12-10

## 2020-12-10 PROCEDURE — 80053 COMPREHEN METABOLIC PANEL: CPT

## 2020-12-10 PROCEDURE — 80061 LIPID PANEL: CPT

## 2020-12-10 PROCEDURE — 85025 COMPLETE CBC W/AUTO DIFF WBC: CPT

## 2020-12-11 LAB
ALBUMIN SERPL-MCNC: 4 G/DL (ref 3.5–5)
ALBUMIN/GLOB SERPL: 1 {RATIO} (ref 1.1–2.2)
ALP SERPL-CCNC: 99 U/L (ref 45–117)
ALT SERPL-CCNC: 32 U/L (ref 12–78)
ANION GAP SERPL CALC-SCNC: 9 MMOL/L (ref 5–15)
AST SERPL-CCNC: 17 U/L (ref 15–37)
BASOPHILS # BLD: 0.1 K/UL (ref 0–0.1)
BASOPHILS NFR BLD: 1 % (ref 0–1)
BILIRUB SERPL-MCNC: 0.4 MG/DL (ref 0.2–1)
BUN SERPL-MCNC: 13 MG/DL (ref 6–20)
BUN/CREAT SERPL: 17 (ref 12–20)
CALCIUM SERPL-MCNC: 9.3 MG/DL (ref 8.5–10.1)
CHLORIDE SERPL-SCNC: 100 MMOL/L (ref 97–108)
CHOLEST SERPL-MCNC: 233 MG/DL
CO2 SERPL-SCNC: 29 MMOL/L (ref 21–32)
CREAT SERPL-MCNC: 0.76 MG/DL (ref 0.55–1.02)
DIFFERENTIAL METHOD BLD: ABNORMAL
EOSINOPHIL # BLD: 0.1 K/UL (ref 0–0.4)
EOSINOPHIL NFR BLD: 2 % (ref 0–7)
ERYTHROCYTE [DISTWIDTH] IN BLOOD BY AUTOMATED COUNT: 13.8 % (ref 11.5–14.5)
GLOBULIN SER CALC-MCNC: 4.2 G/DL (ref 2–4)
GLUCOSE SERPL-MCNC: 98 MG/DL (ref 65–100)
HCT VFR BLD AUTO: 48.6 % (ref 35–47)
HDLC SERPL-MCNC: 47 MG/DL
HDLC SERPL: 5 {RATIO} (ref 0–5)
HGB BLD-MCNC: 15.2 G/DL (ref 11.5–16)
IMM GRANULOCYTES # BLD AUTO: 0 K/UL (ref 0–0.04)
IMM GRANULOCYTES NFR BLD AUTO: 0 % (ref 0–0.5)
LDLC SERPL CALC-MCNC: 142.4 MG/DL (ref 0–100)
LIPID PROFILE,FLP: ABNORMAL
LYMPHOCYTES # BLD: 1.8 K/UL (ref 0.8–3.5)
LYMPHOCYTES NFR BLD: 27 % (ref 12–49)
MCH RBC QN AUTO: 28.8 PG (ref 26–34)
MCHC RBC AUTO-ENTMCNC: 31.3 G/DL (ref 30–36.5)
MCV RBC AUTO: 92.2 FL (ref 80–99)
MONOCYTES # BLD: 0.4 K/UL (ref 0–1)
MONOCYTES NFR BLD: 6 % (ref 5–13)
NEUTS SEG # BLD: 4.4 K/UL (ref 1.8–8)
NEUTS SEG NFR BLD: 64 % (ref 32–75)
NRBC # BLD: 0 K/UL (ref 0–0.01)
NRBC BLD-RTO: 0 PER 100 WBC
PLATELET # BLD AUTO: 306 K/UL (ref 150–400)
PMV BLD AUTO: 10.7 FL (ref 8.9–12.9)
POTASSIUM SERPL-SCNC: 4 MMOL/L (ref 3.5–5.1)
PROT SERPL-MCNC: 8.2 G/DL (ref 6.4–8.2)
RBC # BLD AUTO: 5.27 M/UL (ref 3.8–5.2)
SODIUM SERPL-SCNC: 138 MMOL/L (ref 136–145)
TRIGL SERPL-MCNC: 218 MG/DL (ref ?–150)
VLDLC SERPL CALC-MCNC: 43.6 MG/DL
WBC # BLD AUTO: 6.9 K/UL (ref 3.6–11)

## 2020-12-13 ENCOUNTER — TRANSCRIBE ORDER (OUTPATIENT)
Dept: EMERGENCY DEPT | Age: 45
End: 2020-12-13

## 2020-12-13 ENCOUNTER — HOSPITAL ENCOUNTER (OUTPATIENT)
Dept: GENERAL RADIOLOGY | Age: 45
Discharge: HOME | End: 2020-12-13
Payer: SUBSIDIZED

## 2020-12-13 DIAGNOSIS — M25.519 SHOULDER JOINT PAIN: Primary | ICD-10-CM

## 2020-12-13 DIAGNOSIS — M25.519 SHOULDER JOINT PAIN: ICD-10-CM

## 2020-12-13 PROCEDURE — 73030 X-RAY EXAM OF SHOULDER: CPT

## 2022-03-18 PROBLEM — M79.672 PAIN OF LEFT HEEL: Status: ACTIVE | Noted: 2017-09-22

## 2022-03-19 PROBLEM — J30.89 NON-SEASONAL ALLERGIC RHINITIS: Status: ACTIVE | Noted: 2017-09-22

## 2022-03-19 PROBLEM — I10 ESSENTIAL HYPERTENSION: Status: ACTIVE | Noted: 2017-09-22

## 2022-03-19 PROBLEM — E78.5 HYPERLIPIDEMIA, MILD: Status: ACTIVE | Noted: 2017-09-23

## 2022-06-03 ENCOUNTER — TRANSCRIBE ORDER (OUTPATIENT)
Dept: SCHEDULING | Age: 47
End: 2022-06-03

## 2022-06-03 DIAGNOSIS — Z12.31 VISIT FOR SCREENING MAMMOGRAM: Primary | ICD-10-CM

## 2022-06-20 ENCOUNTER — HOSPITAL ENCOUNTER (OUTPATIENT)
Dept: MAMMOGRAPHY | Age: 47
Discharge: HOME | End: 2022-06-20
Attending: INTERNAL MEDICINE
Payer: SUBSIDIZED

## 2022-06-20 DIAGNOSIS — Z12.31 VISIT FOR SCREENING MAMMOGRAM: ICD-10-CM

## 2022-06-20 PROCEDURE — 77067 SCR MAMMO BI INCL CAD: CPT

## 2022-06-29 ENCOUNTER — HOSPITAL ENCOUNTER (OUTPATIENT)
Dept: LAB | Age: 47
Discharge: HOME | End: 2022-06-29

## 2022-06-29 PROCEDURE — 80061 LIPID PANEL: CPT

## 2022-06-29 PROCEDURE — 80048 BASIC METABOLIC PNL TOTAL CA: CPT

## 2022-06-29 PROCEDURE — 83540 ASSAY OF IRON: CPT

## 2022-06-29 PROCEDURE — 82728 ASSAY OF FERRITIN: CPT

## 2022-06-30 LAB
ANION GAP SERPL CALC-SCNC: 7 MMOL/L (ref 5–15)
BUN SERPL-MCNC: 23 MG/DL (ref 6–20)
BUN/CREAT SERPL: 33 (ref 12–20)
CALCIUM SERPL-MCNC: 9.6 MG/DL (ref 8.5–10.1)
CHLORIDE SERPL-SCNC: 104 MMOL/L (ref 97–108)
CHOLEST SERPL-MCNC: 247 MG/DL
CO2 SERPL-SCNC: 27 MMOL/L (ref 21–32)
CREAT SERPL-MCNC: 0.7 MG/DL (ref 0.55–1.02)
FERRITIN SERPL-MCNC: 127 NG/ML (ref 8–252)
GLUCOSE SERPL-MCNC: 107 MG/DL (ref 65–100)
HDLC SERPL-MCNC: 46 MG/DL
HDLC SERPL: 5.4 {RATIO} (ref 0–5)
IRON SATN MFR SERPL: 19 % (ref 20–50)
IRON SERPL-MCNC: 66 UG/DL (ref 35–150)
LDLC SERPL CALC-MCNC: 139.2 MG/DL (ref 0–100)
POTASSIUM SERPL-SCNC: 4.6 MMOL/L (ref 3.5–5.1)
SODIUM SERPL-SCNC: 138 MMOL/L (ref 136–145)
TIBC SERPL-MCNC: 340 UG/DL (ref 250–450)
TRIGL SERPL-MCNC: 309 MG/DL (ref ?–150)
VLDLC SERPL CALC-MCNC: 61.8 MG/DL

## 2023-02-08 PROCEDURE — 85025 COMPLETE CBC W/AUTO DIFF WBC: CPT

## 2023-02-08 PROCEDURE — 36415 COLL VENOUS BLD VENIPUNCTURE: CPT

## 2023-02-08 PROCEDURE — 80053 COMPREHEN METABOLIC PANEL: CPT

## 2023-02-08 PROCEDURE — 83036 HEMOGLOBIN GLYCOSYLATED A1C: CPT

## 2023-02-08 PROCEDURE — 80061 LIPID PANEL: CPT

## 2023-02-09 ENCOUNTER — HOSPITAL ENCOUNTER (OUTPATIENT)
Dept: LAB | Age: 48
Discharge: HOME | End: 2023-02-09

## 2023-02-09 LAB
ALBUMIN SERPL-MCNC: 4 G/DL (ref 3.5–5)
ALBUMIN/GLOB SERPL: 1 (ref 1.1–2.2)
ALP SERPL-CCNC: 73 U/L (ref 45–117)
ALT SERPL-CCNC: 33 U/L (ref 12–78)
ANION GAP SERPL CALC-SCNC: 6 MMOL/L (ref 5–15)
AST SERPL-CCNC: 16 U/L (ref 15–37)
BASOPHILS # BLD: 0.1 K/UL (ref 0–0.1)
BASOPHILS NFR BLD: 1 % (ref 0–1)
BILIRUB SERPL-MCNC: 0.4 MG/DL (ref 0.2–1)
BUN SERPL-MCNC: 16 MG/DL (ref 6–20)
BUN/CREAT SERPL: 20 (ref 12–20)
CALCIUM SERPL-MCNC: 9.5 MG/DL (ref 8.5–10.1)
CHLORIDE SERPL-SCNC: 104 MMOL/L (ref 97–108)
CHOLEST SERPL-MCNC: 242 MG/DL
CO2 SERPL-SCNC: 25 MMOL/L (ref 21–32)
CREAT SERPL-MCNC: 0.8 MG/DL (ref 0.55–1.02)
DIFFERENTIAL METHOD BLD: NORMAL
EOSINOPHIL # BLD: 0.2 K/UL (ref 0–0.4)
EOSINOPHIL NFR BLD: 3 % (ref 0–7)
ERYTHROCYTE [DISTWIDTH] IN BLOOD BY AUTOMATED COUNT: 13.2 % (ref 11.5–14.5)
EST. AVERAGE GLUCOSE BLD GHB EST-MCNC: 114 MG/DL
GLOBULIN SER CALC-MCNC: 4 G/DL (ref 2–4)
GLUCOSE SERPL-MCNC: 101 MG/DL (ref 65–100)
HBA1C MFR BLD: 5.6 % (ref 4–5.6)
HCT VFR BLD AUTO: 43.1 % (ref 35–47)
HDLC SERPL-MCNC: 46 MG/DL
HDLC SERPL: 5.3 (ref 0–5)
HGB BLD-MCNC: 13.5 G/DL (ref 11.5–16)
IMM GRANULOCYTES # BLD AUTO: 0 K/UL (ref 0–0.04)
IMM GRANULOCYTES NFR BLD AUTO: 0 % (ref 0–0.5)
LDLC SERPL CALC-MCNC: 147.6 MG/DL (ref 0–100)
LYMPHOCYTES # BLD: 2.5 K/UL (ref 0.8–3.5)
LYMPHOCYTES NFR BLD: 36 % (ref 12–49)
MCH RBC QN AUTO: 28.5 PG (ref 26–34)
MCHC RBC AUTO-ENTMCNC: 31.3 G/DL (ref 30–36.5)
MCV RBC AUTO: 91.1 FL (ref 80–99)
MONOCYTES # BLD: 0.5 K/UL (ref 0–1)
MONOCYTES NFR BLD: 7 % (ref 5–13)
NEUTS SEG # BLD: 3.7 K/UL (ref 1.8–8)
NEUTS SEG NFR BLD: 53 % (ref 32–75)
NRBC # BLD: 0 K/UL (ref 0–0.01)
NRBC BLD-RTO: 0 PER 100 WBC
PLATELET # BLD AUTO: 267 K/UL (ref 150–400)
PMV BLD AUTO: 11.1 FL (ref 8.9–12.9)
POTASSIUM SERPL-SCNC: 4.4 MMOL/L (ref 3.5–5.1)
PROT SERPL-MCNC: 8 G/DL (ref 6.4–8.2)
RBC # BLD AUTO: 4.73 M/UL (ref 3.8–5.2)
SODIUM SERPL-SCNC: 135 MMOL/L (ref 136–145)
TRIGL SERPL-MCNC: 242 MG/DL (ref ?–150)
VLDLC SERPL CALC-MCNC: 48.4 MG/DL
WBC # BLD AUTO: 7 K/UL (ref 3.6–11)

## 2023-05-17 RX ORDER — CETIRIZINE HYDROCHLORIDE 10 MG/1
10 TABLET ORAL DAILY
COMMUNITY
Start: 2015-09-09

## 2023-05-17 RX ORDER — AMLODIPINE BESYLATE 10 MG/1
10 TABLET ORAL DAILY
COMMUNITY
Start: 2018-08-14

## 2023-06-25 ENCOUNTER — HOSPITAL ENCOUNTER (EMERGENCY)
Facility: HOSPITAL | Age: 48
Discharge: HOME | End: 2023-06-25
Attending: EMERGENCY MEDICINE

## 2023-06-25 ENCOUNTER — APPOINTMENT (OUTPATIENT)
Facility: HOSPITAL | Age: 48
End: 2023-06-25

## 2023-06-25 VITALS
DIASTOLIC BLOOD PRESSURE: 92 MMHG | HEART RATE: 88 BPM | OXYGEN SATURATION: 97 % | RESPIRATION RATE: 18 BRPM | SYSTOLIC BLOOD PRESSURE: 145 MMHG | TEMPERATURE: 98 F

## 2023-06-25 DIAGNOSIS — M79.671 RIGHT FOOT PAIN: ICD-10-CM

## 2023-06-25 DIAGNOSIS — M72.2 PLANTAR FASCIITIS OF RIGHT FOOT: Primary | ICD-10-CM

## 2023-06-25 PROCEDURE — 73630 X-RAY EXAM OF FOOT: CPT

## 2023-06-25 PROCEDURE — 99283 EMERGENCY DEPT VISIT LOW MDM: CPT

## 2023-06-25 ASSESSMENT — ENCOUNTER SYMPTOMS
COUGH: 0
SHORTNESS OF BREATH: 0
NAUSEA: 0
VOMITING: 0
SORE THROAT: 0
ABDOMINAL PAIN: 0
EYE PAIN: 0
DIARRHEA: 0

## 2023-06-25 ASSESSMENT — PAIN SCALES - GENERAL: PAINLEVEL_OUTOF10: 6

## 2023-06-25 ASSESSMENT — PAIN DESCRIPTION - DESCRIPTORS: DESCRIPTORS: ACHING

## 2023-06-25 ASSESSMENT — PAIN - FUNCTIONAL ASSESSMENT: PAIN_FUNCTIONAL_ASSESSMENT: 0-10

## 2023-06-25 ASSESSMENT — PAIN DESCRIPTION - ORIENTATION: ORIENTATION: RIGHT

## 2023-06-25 ASSESSMENT — PAIN DESCRIPTION - LOCATION: LOCATION: FOOT

## 2023-10-12 ENCOUNTER — HOSPITAL ENCOUNTER (OUTPATIENT)
Facility: HOSPITAL | Age: 48
Setting detail: SPECIMEN
Discharge: HOME | End: 2023-10-15

## 2023-10-12 PROCEDURE — 36415 COLL VENOUS BLD VENIPUNCTURE: CPT

## 2023-10-12 PROCEDURE — 84443 ASSAY THYROID STIM HORMONE: CPT

## 2023-10-12 PROCEDURE — 80053 COMPREHEN METABOLIC PANEL: CPT

## 2023-10-12 PROCEDURE — 83036 HEMOGLOBIN GLYCOSYLATED A1C: CPT

## 2023-10-12 PROCEDURE — 85025 COMPLETE CBC W/AUTO DIFF WBC: CPT

## 2023-10-12 PROCEDURE — 82570 ASSAY OF URINE CREATININE: CPT

## 2023-10-12 PROCEDURE — 80061 LIPID PANEL: CPT

## 2023-10-12 PROCEDURE — 82043 UR ALBUMIN QUANTITATIVE: CPT

## 2023-10-13 LAB
ALBUMIN SERPL-MCNC: 4.4 G/DL (ref 3.5–5)
ALBUMIN/GLOB SERPL: 1 (ref 1.1–2.2)
ALP SERPL-CCNC: 84 U/L (ref 45–117)
ALT SERPL-CCNC: 33 U/L (ref 12–78)
ANION GAP SERPL CALC-SCNC: 6 MMOL/L (ref 5–15)
AST SERPL-CCNC: 17 U/L (ref 15–37)
BASOPHILS # BLD: 0.1 K/UL (ref 0–0.1)
BASOPHILS NFR BLD: 1 % (ref 0–1)
BILIRUB SERPL-MCNC: 0.5 MG/DL (ref 0.2–1)
BUN SERPL-MCNC: 15 MG/DL (ref 6–20)
BUN/CREAT SERPL: 19 (ref 12–20)
CALCIUM SERPL-MCNC: 10.1 MG/DL (ref 8.5–10.1)
CHLORIDE SERPL-SCNC: 101 MMOL/L (ref 97–108)
CHOLEST SERPL-MCNC: 277 MG/DL
CO2 SERPL-SCNC: 29 MMOL/L (ref 21–32)
CREAT SERPL-MCNC: 0.81 MG/DL (ref 0.55–1.02)
CREAT UR-MCNC: 39.4 MG/DL
DIFFERENTIAL METHOD BLD: NORMAL
EOSINOPHIL # BLD: 0.2 K/UL (ref 0–0.4)
EOSINOPHIL NFR BLD: 2 % (ref 0–7)
ERYTHROCYTE [DISTWIDTH] IN BLOOD BY AUTOMATED COUNT: 13 % (ref 11.5–14.5)
EST. AVERAGE GLUCOSE BLD GHB EST-MCNC: 117 MG/DL
GLOBULIN SER CALC-MCNC: 4.5 G/DL (ref 2–4)
GLUCOSE SERPL-MCNC: 102 MG/DL (ref 65–100)
HBA1C MFR BLD: 5.7 % (ref 4–5.6)
HCT VFR BLD AUTO: 45.9 % (ref 35–47)
HDLC SERPL-MCNC: 45 MG/DL
HDLC SERPL: 6.2 (ref 0–5)
HGB BLD-MCNC: 14.5 G/DL (ref 11.5–16)
IMM GRANULOCYTES # BLD AUTO: 0 K/UL (ref 0–0.04)
IMM GRANULOCYTES NFR BLD AUTO: 0 % (ref 0–0.5)
LDLC SERPL CALC-MCNC: 179 MG/DL (ref 0–100)
LYMPHOCYTES # BLD: 2.6 K/UL (ref 0.8–3.5)
LYMPHOCYTES NFR BLD: 35 % (ref 12–49)
MCH RBC QN AUTO: 28.1 PG (ref 26–34)
MCHC RBC AUTO-ENTMCNC: 31.6 G/DL (ref 30–36.5)
MCV RBC AUTO: 89 FL (ref 80–99)
MICROALBUMIN UR-MCNC: 0.55 MG/DL
MICROALBUMIN/CREAT UR-RTO: 14 MG/G (ref 0–30)
MONOCYTES # BLD: 0.4 K/UL (ref 0–1)
MONOCYTES NFR BLD: 6 % (ref 5–13)
NEUTS SEG # BLD: 4.1 K/UL (ref 1.8–8)
NEUTS SEG NFR BLD: 56 % (ref 32–75)
NRBC # BLD: 0 K/UL (ref 0–0.01)
NRBC BLD-RTO: 0 PER 100 WBC
PLATELET # BLD AUTO: 286 K/UL (ref 150–400)
PMV BLD AUTO: 10.8 FL (ref 8.9–12.9)
POTASSIUM SERPL-SCNC: 4.3 MMOL/L (ref 3.5–5.1)
PROT SERPL-MCNC: 8.9 G/DL (ref 6.4–8.2)
RBC # BLD AUTO: 5.16 M/UL (ref 3.8–5.2)
SODIUM SERPL-SCNC: 136 MMOL/L (ref 136–145)
TRIGL SERPL-MCNC: 265 MG/DL
TSH SERPL DL<=0.05 MIU/L-ACNC: 0.9 UIU/ML (ref 0.36–3.74)
VLDLC SERPL CALC-MCNC: 53 MG/DL
WBC # BLD AUTO: 7.4 K/UL (ref 3.6–11)

## 2024-09-04 ENCOUNTER — HOSPITAL ENCOUNTER (OUTPATIENT)
Facility: HOSPITAL | Age: 49
Setting detail: SPECIMEN
Discharge: HOME | End: 2024-09-07

## 2024-09-04 PROCEDURE — 88175 CYTOPATH C/V AUTO FLUID REDO: CPT

## 2024-09-04 PROCEDURE — 87624 HPV HI-RISK TYP POOLED RSLT: CPT

## 2024-09-06 ENCOUNTER — HOSPITAL ENCOUNTER (OUTPATIENT)
Facility: HOSPITAL | Age: 49
Setting detail: SPECIMEN
Discharge: HOME | End: 2024-09-09

## 2024-09-06 LAB — HPV I/H RISK 1 DNA CVX QL PROBE+SIG AMP: NEGATIVE

## 2024-09-06 PROCEDURE — 80053 COMPREHEN METABOLIC PANEL: CPT

## 2024-09-06 PROCEDURE — 80061 LIPID PANEL: CPT

## 2024-09-06 PROCEDURE — 85025 COMPLETE CBC W/AUTO DIFF WBC: CPT

## 2024-09-06 PROCEDURE — 84443 ASSAY THYROID STIM HORMONE: CPT

## 2024-09-07 LAB
ALBUMIN SERPL-MCNC: 4.1 G/DL (ref 3.5–5)
ALBUMIN/GLOB SERPL: 0.9 (ref 1.1–2.2)
ALP SERPL-CCNC: 90 U/L (ref 45–117)
ALT SERPL-CCNC: 40 U/L (ref 12–78)
ANION GAP SERPL CALC-SCNC: 5 MMOL/L (ref 2–12)
AST SERPL-CCNC: 23 U/L (ref 15–37)
BASOPHILS # BLD: 0 K/UL (ref 0–0.1)
BASOPHILS NFR BLD: 1 % (ref 0–1)
BILIRUB SERPL-MCNC: 0.5 MG/DL (ref 0.2–1)
BUN SERPL-MCNC: 13 MG/DL (ref 6–20)
BUN/CREAT SERPL: 16 (ref 12–20)
CALCIUM SERPL-MCNC: 10 MG/DL (ref 8.5–10.1)
CHLORIDE SERPL-SCNC: 105 MMOL/L (ref 97–108)
CHOLEST SERPL-MCNC: 277 MG/DL
CO2 SERPL-SCNC: 27 MMOL/L (ref 21–32)
CREAT SERPL-MCNC: 0.83 MG/DL (ref 0.55–1.02)
DIFFERENTIAL METHOD BLD: NORMAL
EOSINOPHIL # BLD: 0.1 K/UL (ref 0–0.4)
EOSINOPHIL NFR BLD: 2 % (ref 0–7)
ERYTHROCYTE [DISTWIDTH] IN BLOOD BY AUTOMATED COUNT: 13.2 % (ref 11.5–14.5)
GLOBULIN SER CALC-MCNC: 4.5 G/DL (ref 2–4)
GLUCOSE SERPL-MCNC: 105 MG/DL (ref 65–100)
HCT VFR BLD AUTO: 44.7 % (ref 35–47)
HDLC SERPL-MCNC: 41 MG/DL
HDLC SERPL: 6.8 (ref 0–5)
HGB BLD-MCNC: 14.2 G/DL (ref 11.5–16)
IMM GRANULOCYTES # BLD AUTO: 0 K/UL (ref 0–0.04)
IMM GRANULOCYTES NFR BLD AUTO: 0 % (ref 0–0.5)
LDLC SERPL CALC-MCNC: 171.2 MG/DL (ref 0–100)
LYMPHOCYTES # BLD: 2 K/UL (ref 0.8–3.5)
LYMPHOCYTES NFR BLD: 31 % (ref 12–49)
MCH RBC QN AUTO: 28.4 PG (ref 26–34)
MCHC RBC AUTO-ENTMCNC: 31.8 G/DL (ref 30–36.5)
MCV RBC AUTO: 89.4 FL (ref 80–99)
MONOCYTES # BLD: 0.3 K/UL (ref 0–1)
MONOCYTES NFR BLD: 5 % (ref 5–13)
NEUTS SEG # BLD: 4 K/UL (ref 1.8–8)
NEUTS SEG NFR BLD: 61 % (ref 32–75)
NRBC # BLD: 0 K/UL (ref 0–0.01)
NRBC BLD-RTO: 0 PER 100 WBC
PLATELET # BLD AUTO: 263 K/UL (ref 150–400)
PMV BLD AUTO: 10.9 FL (ref 8.9–12.9)
POTASSIUM SERPL-SCNC: 4.3 MMOL/L (ref 3.5–5.1)
PROT SERPL-MCNC: 8.6 G/DL (ref 6.4–8.2)
RBC # BLD AUTO: 5 M/UL (ref 3.8–5.2)
SODIUM SERPL-SCNC: 137 MMOL/L (ref 136–145)
TRIGL SERPL-MCNC: 324 MG/DL
TSH SERPL DL<=0.05 MIU/L-ACNC: 1.3 UIU/ML (ref 0.36–3.74)
VLDLC SERPL CALC-MCNC: 64.8 MG/DL
WBC # BLD AUTO: 6.4 K/UL (ref 3.6–11)

## 2025-03-06 ENCOUNTER — HOSPITAL ENCOUNTER (OUTPATIENT)
Facility: HOSPITAL | Age: 50
Setting detail: SPECIMEN
Discharge: HOME | End: 2025-03-09

## 2025-03-06 PROCEDURE — 80061 LIPID PANEL: CPT

## 2025-03-06 PROCEDURE — 84443 ASSAY THYROID STIM HORMONE: CPT

## 2025-03-06 PROCEDURE — 83036 HEMOGLOBIN GLYCOSYLATED A1C: CPT

## 2025-03-06 PROCEDURE — 80053 COMPREHEN METABOLIC PANEL: CPT

## 2025-03-06 PROCEDURE — 36415 COLL VENOUS BLD VENIPUNCTURE: CPT

## 2025-03-06 PROCEDURE — 85025 COMPLETE CBC W/AUTO DIFF WBC: CPT

## 2025-03-07 LAB
ALBUMIN SERPL-MCNC: 4 G/DL (ref 3.5–5)
ALBUMIN/GLOB SERPL: 1 (ref 1.1–2.2)
ALP SERPL-CCNC: 88 U/L (ref 45–117)
ALT SERPL-CCNC: 29 U/L (ref 12–78)
ANION GAP SERPL CALC-SCNC: 6 MMOL/L (ref 2–12)
AST SERPL-CCNC: 14 U/L (ref 15–37)
BASOPHILS # BLD: 0.05 K/UL (ref 0–0.1)
BASOPHILS NFR BLD: 0.8 % (ref 0–1)
BILIRUB SERPL-MCNC: 0.5 MG/DL (ref 0.2–1)
BUN SERPL-MCNC: 12 MG/DL (ref 6–20)
BUN/CREAT SERPL: 16 (ref 12–20)
CALCIUM SERPL-MCNC: 9.8 MG/DL (ref 8.5–10.1)
CHLORIDE SERPL-SCNC: 103 MMOL/L (ref 97–108)
CHOLEST SERPL-MCNC: 228 MG/DL
CO2 SERPL-SCNC: 26 MMOL/L (ref 21–32)
CREAT SERPL-MCNC: 0.75 MG/DL (ref 0.55–1.02)
DIFFERENTIAL METHOD BLD: NORMAL
EOSINOPHIL # BLD: 0.09 K/UL (ref 0–0.4)
EOSINOPHIL NFR BLD: 1.5 % (ref 0–7)
ERYTHROCYTE [DISTWIDTH] IN BLOOD BY AUTOMATED COUNT: 13.5 % (ref 11.5–14.5)
EST. AVERAGE GLUCOSE BLD GHB EST-MCNC: 117 MG/DL
GLOBULIN SER CALC-MCNC: 4.1 G/DL (ref 2–4)
GLUCOSE SERPL-MCNC: 107 MG/DL (ref 65–100)
HBA1C MFR BLD: 5.7 % (ref 4–5.6)
HCT VFR BLD AUTO: 43.2 % (ref 35–47)
HDLC SERPL-MCNC: 44 MG/DL
HDLC SERPL: 5.2 (ref 0–5)
HGB BLD-MCNC: 13.7 G/DL (ref 11.5–16)
IMM GRANULOCYTES # BLD AUTO: 0.01 K/UL (ref 0–0.04)
IMM GRANULOCYTES NFR BLD AUTO: 0.2 % (ref 0–0.5)
LDLC SERPL CALC-MCNC: 127 MG/DL (ref 0–100)
LYMPHOCYTES # BLD: 1.88 K/UL (ref 0.8–3.5)
LYMPHOCYTES NFR BLD: 31.6 % (ref 12–49)
MCH RBC QN AUTO: 28.2 PG (ref 26–34)
MCHC RBC AUTO-ENTMCNC: 31.7 G/DL (ref 30–36.5)
MCV RBC AUTO: 88.9 FL (ref 80–99)
MONOCYTES # BLD: 0.34 K/UL (ref 0–1)
MONOCYTES NFR BLD: 5.7 % (ref 5–13)
NEUTS SEG # BLD: 3.58 K/UL (ref 1.8–8)
NEUTS SEG NFR BLD: 60.2 % (ref 32–75)
NRBC # BLD: 0 K/UL (ref 0–0.01)
NRBC BLD-RTO: 0 PER 100 WBC
PLATELET # BLD AUTO: 252 K/UL (ref 150–400)
PMV BLD AUTO: 11.1 FL (ref 8.9–12.9)
POTASSIUM SERPL-SCNC: 4.4 MMOL/L (ref 3.5–5.1)
PROT SERPL-MCNC: 8.1 G/DL (ref 6.4–8.2)
RBC # BLD AUTO: 4.86 M/UL (ref 3.8–5.2)
SODIUM SERPL-SCNC: 135 MMOL/L (ref 136–145)
TRIGL SERPL-MCNC: 285 MG/DL
TSH SERPL DL<=0.05 MIU/L-ACNC: 1.01 UIU/ML (ref 0.36–3.74)
VLDLC SERPL CALC-MCNC: 57 MG/DL
WBC # BLD AUTO: 6 K/UL (ref 3.6–11)